# Patient Record
Sex: MALE | Race: BLACK OR AFRICAN AMERICAN | URBAN - METROPOLITAN AREA
[De-identification: names, ages, dates, MRNs, and addresses within clinical notes are randomized per-mention and may not be internally consistent; named-entity substitution may affect disease eponyms.]

---

## 2018-10-31 ENCOUNTER — ANESTHESIA EVENT (OUTPATIENT)
Dept: SURGERY | Facility: CLINIC | Age: 58
DRG: 470 | End: 2018-10-31
Payer: COMMERCIAL

## 2018-10-31 ENCOUNTER — HOSPITAL ENCOUNTER (INPATIENT)
Facility: CLINIC | Age: 58
LOS: 2 days | Discharge: HOME | DRG: 470 | End: 2018-11-02
Attending: ORTHOPAEDIC SURGERY | Admitting: ORTHOPAEDIC SURGERY
Payer: COMMERCIAL

## 2018-10-31 ENCOUNTER — ANESTHESIA (OUTPATIENT)
Dept: SURGERY | Facility: CLINIC | Age: 58
DRG: 470 | End: 2018-10-31
Payer: COMMERCIAL

## 2018-10-31 DIAGNOSIS — Z96.651 HISTORY OF ARTHROPLASTY OF RIGHT KNEE: Primary | ICD-10-CM

## 2018-10-31 PROBLEM — Z96.652 HX OF TOTAL KNEE ARTHROPLASTY, LEFT: Status: ACTIVE | Noted: 2018-10-31

## 2018-10-31 LAB — POTASSIUM SERPL-SCNC: 4 MMOL/L (ref 3.4–5.3)

## 2018-10-31 PROCEDURE — 25000128 H RX IP 250 OP 636: Performed by: NURSE ANESTHETIST, CERTIFIED REGISTERED

## 2018-10-31 PROCEDURE — 12000007 ZZH R&B INTERMEDIATE

## 2018-10-31 PROCEDURE — 25000125 ZZHC RX 250: Performed by: NURSE ANESTHETIST, CERTIFIED REGISTERED

## 2018-10-31 PROCEDURE — 27211024 ZZHC OR SUPPLY OTHER OPNP: Performed by: ORTHOPAEDIC SURGERY

## 2018-10-31 PROCEDURE — 25000128 H RX IP 250 OP 636: Performed by: SURGERY

## 2018-10-31 PROCEDURE — 25000125 ZZHC RX 250: Performed by: ORTHOPAEDIC SURGERY

## 2018-10-31 PROCEDURE — 40000171 ZZH STATISTIC PRE-PROCEDURE ASSESSMENT III: Performed by: ORTHOPAEDIC SURGERY

## 2018-10-31 PROCEDURE — 27810169 ZZH OR IMPLANT GENERAL: Performed by: ORTHOPAEDIC SURGERY

## 2018-10-31 PROCEDURE — 0SRC0J9 REPLACEMENT OF RIGHT KNEE JOINT WITH SYNTHETIC SUBSTITUTE, CEMENTED, OPEN APPROACH: ICD-10-PCS | Performed by: ORTHOPAEDIC SURGERY

## 2018-10-31 PROCEDURE — 25800025 ZZH RX 258: Performed by: ORTHOPAEDIC SURGERY

## 2018-10-31 PROCEDURE — 25000128 H RX IP 250 OP 636: Performed by: ANESTHESIOLOGY

## 2018-10-31 PROCEDURE — 71000012 ZZH RECOVERY PHASE 1 LEVEL 1 FIRST HR: Performed by: ORTHOPAEDIC SURGERY

## 2018-10-31 PROCEDURE — 27210794 ZZH OR GENERAL SUPPLY STERILE: Performed by: ORTHOPAEDIC SURGERY

## 2018-10-31 PROCEDURE — 36000093 ZZH SURGERY LEVEL 4 1ST 30 MIN: Performed by: ORTHOPAEDIC SURGERY

## 2018-10-31 PROCEDURE — 25000132 ZZH RX MED GY IP 250 OP 250 PS 637: Performed by: ORTHOPAEDIC SURGERY

## 2018-10-31 PROCEDURE — 25000128 H RX IP 250 OP 636: Performed by: ORTHOPAEDIC SURGERY

## 2018-10-31 PROCEDURE — C1776 JOINT DEVICE (IMPLANTABLE): HCPCS | Performed by: ORTHOPAEDIC SURGERY

## 2018-10-31 PROCEDURE — 84132 ASSAY OF SERUM POTASSIUM: CPT | Performed by: ANESTHESIOLOGY

## 2018-10-31 PROCEDURE — 37000009 ZZH ANESTHESIA TECHNICAL FEE, EACH ADDTL 15 MIN: Performed by: ORTHOPAEDIC SURGERY

## 2018-10-31 PROCEDURE — 36000063 ZZH SURGERY LEVEL 4 EA 15 ADDTL MIN: Performed by: ORTHOPAEDIC SURGERY

## 2018-10-31 PROCEDURE — 25000128 H RX IP 250 OP 636

## 2018-10-31 PROCEDURE — 36415 COLL VENOUS BLD VENIPUNCTURE: CPT | Performed by: ANESTHESIOLOGY

## 2018-10-31 PROCEDURE — 37000008 ZZH ANESTHESIA TECHNICAL FEE, 1ST 30 MIN: Performed by: ORTHOPAEDIC SURGERY

## 2018-10-31 PROCEDURE — 71000013 ZZH RECOVERY PHASE 1 LEVEL 1 EA ADDTL HR: Performed by: ORTHOPAEDIC SURGERY

## 2018-10-31 DEVICE — IMP COMP FEMORAL S&N LEGION PS NP SZ7 RT 71423237: Type: IMPLANTABLE DEVICE | Site: KNEE | Status: FUNCTIONAL

## 2018-10-31 DEVICE — IMP BONE CEMENT SIMPLEX W/GENTAMICIN 6195-1-001: Type: IMPLANTABLE DEVICE | Site: KNEE | Status: FUNCTIONAL

## 2018-10-31 DEVICE — IMP COMP PATELLA SNN GEN II RESURFACING 38MM 71926225: Type: IMPLANTABLE DEVICE | Site: KNEE | Status: FUNCTIONAL

## 2018-10-31 DEVICE — IMP BASEPLATE TIBIAL GENESIS II SZ 6 RT TI 71420188: Type: IMPLANTABLE DEVICE | Site: KNEE | Status: FUNCTIONAL

## 2018-10-31 DEVICE — BONE CEMENT RADIOPAQUE SIMPLEX HV FULL DOSE 6194-1-001: Type: IMPLANTABLE DEVICE | Site: KNEE | Status: FUNCTIONAL

## 2018-10-31 RX ORDER — ACETAMINOPHEN 325 MG/1
650 TABLET ORAL EVERY 4 HOURS PRN
Status: DISCONTINUED | OUTPATIENT
Start: 2018-11-03 | End: 2018-11-02 | Stop reason: HOSPADM

## 2018-10-31 RX ORDER — CEFAZOLIN SODIUM IN 0.9 % NACL 3 G/100 ML
3 INTRAVENOUS SOLUTION, PIGGYBACK (ML) INTRAVENOUS
Status: DISCONTINUED | OUTPATIENT
Start: 2018-10-31 | End: 2018-10-31 | Stop reason: HOSPADM

## 2018-10-31 RX ORDER — DIAZEPAM 5 MG
5 TABLET ORAL EVERY 6 HOURS PRN
Status: DISCONTINUED | OUTPATIENT
Start: 2018-10-31 | End: 2018-11-02 | Stop reason: HOSPADM

## 2018-10-31 RX ORDER — DEXAMETHASONE SODIUM PHOSPHATE 4 MG/ML
INJECTION, SOLUTION INTRA-ARTICULAR; INTRALESIONAL; INTRAMUSCULAR; INTRAVENOUS; SOFT TISSUE PRN
Status: DISCONTINUED | OUTPATIENT
Start: 2018-10-31 | End: 2018-10-31

## 2018-10-31 RX ORDER — FENTANYL CITRATE 50 UG/ML
25-50 INJECTION, SOLUTION INTRAMUSCULAR; INTRAVENOUS
Status: DISCONTINUED | OUTPATIENT
Start: 2018-10-31 | End: 2018-10-31 | Stop reason: HOSPADM

## 2018-10-31 RX ORDER — KETOROLAC TROMETHAMINE 30 MG/ML
INJECTION, SOLUTION INTRAMUSCULAR; INTRAVENOUS PRN
Status: DISCONTINUED | OUTPATIENT
Start: 2018-10-31 | End: 2018-10-31

## 2018-10-31 RX ORDER — CEFAZOLIN SODIUM 1 G/3ML
1 INJECTION, POWDER, FOR SOLUTION INTRAMUSCULAR; INTRAVENOUS SEE ADMIN INSTRUCTIONS
Status: DISCONTINUED | OUTPATIENT
Start: 2018-10-31 | End: 2018-10-31 | Stop reason: HOSPADM

## 2018-10-31 RX ORDER — ONDANSETRON 4 MG/1
4 TABLET, ORALLY DISINTEGRATING ORAL EVERY 6 HOURS PRN
Status: DISCONTINUED | OUTPATIENT
Start: 2018-10-31 | End: 2018-10-31

## 2018-10-31 RX ORDER — ONDANSETRON 2 MG/ML
4 INJECTION INTRAMUSCULAR; INTRAVENOUS EVERY 6 HOURS PRN
Status: DISCONTINUED | OUTPATIENT
Start: 2018-10-31 | End: 2018-10-31

## 2018-10-31 RX ORDER — ACETAMINOPHEN 325 MG/1
650 TABLET ORAL EVERY 4 HOURS PRN
Status: DISCONTINUED | OUTPATIENT
Start: 2018-11-03 | End: 2018-10-31

## 2018-10-31 RX ORDER — OXYCODONE HCL 10 MG/1
10 TABLET, FILM COATED, EXTENDED RELEASE ORAL EVERY 12 HOURS
Status: COMPLETED | OUTPATIENT
Start: 2018-10-31 | End: 2018-11-02

## 2018-10-31 RX ORDER — LIDOCAINE HYDROCHLORIDE 20 MG/ML
INJECTION, SOLUTION INFILTRATION; PERINEURAL PRN
Status: DISCONTINUED | OUTPATIENT
Start: 2018-10-31 | End: 2018-10-31

## 2018-10-31 RX ORDER — ACETAMINOPHEN 325 MG/1
975 TABLET ORAL EVERY 8 HOURS
Status: DISCONTINUED | OUTPATIENT
Start: 2018-10-31 | End: 2018-10-31

## 2018-10-31 RX ORDER — CEFAZOLIN SODIUM 2 G/100ML
2 INJECTION, SOLUTION INTRAVENOUS EVERY 8 HOURS
Status: COMPLETED | OUTPATIENT
Start: 2018-10-31 | End: 2018-11-01

## 2018-10-31 RX ORDER — ONDANSETRON 2 MG/ML
4 INJECTION INTRAMUSCULAR; INTRAVENOUS EVERY 6 HOURS PRN
Status: DISCONTINUED | OUTPATIENT
Start: 2018-10-31 | End: 2018-11-02 | Stop reason: HOSPADM

## 2018-10-31 RX ORDER — AMLODIPINE BESYLATE 10 MG/1
10 TABLET ORAL EVERY MORNING
Status: DISCONTINUED | OUTPATIENT
Start: 2018-11-01 | End: 2018-10-31

## 2018-10-31 RX ORDER — ONDANSETRON 2 MG/ML
4 INJECTION INTRAMUSCULAR; INTRAVENOUS EVERY 30 MIN PRN
Status: DISCONTINUED | OUTPATIENT
Start: 2018-10-31 | End: 2018-10-31 | Stop reason: HOSPADM

## 2018-10-31 RX ORDER — ONDANSETRON 4 MG/1
4 TABLET, ORALLY DISINTEGRATING ORAL EVERY 6 HOURS PRN
Status: DISCONTINUED | OUTPATIENT
Start: 2018-10-31 | End: 2018-11-02 | Stop reason: HOSPADM

## 2018-10-31 RX ORDER — CEFAZOLIN SODIUM IN 0.9 % NACL 3 G/100 ML
3 INTRAVENOUS SOLUTION, PIGGYBACK (ML) INTRAVENOUS
Status: COMPLETED | OUTPATIENT
Start: 2018-10-31 | End: 2018-10-31

## 2018-10-31 RX ORDER — ACETAMINOPHEN 325 MG/1
975 TABLET ORAL EVERY 8 HOURS
Status: DISCONTINUED | OUTPATIENT
Start: 2018-10-31 | End: 2018-11-02 | Stop reason: HOSPADM

## 2018-10-31 RX ORDER — FENTANYL CITRATE 50 UG/ML
INJECTION, SOLUTION INTRAMUSCULAR; INTRAVENOUS PRN
Status: DISCONTINUED | OUTPATIENT
Start: 2018-10-31 | End: 2018-10-31

## 2018-10-31 RX ORDER — NALOXONE HYDROCHLORIDE 0.4 MG/ML
.1-.4 INJECTION, SOLUTION INTRAMUSCULAR; INTRAVENOUS; SUBCUTANEOUS
Status: DISCONTINUED | OUTPATIENT
Start: 2018-10-31 | End: 2018-10-31

## 2018-10-31 RX ORDER — LIDOCAINE 40 MG/G
CREAM TOPICAL
Status: DISCONTINUED | OUTPATIENT
Start: 2018-10-31 | End: 2018-11-02 | Stop reason: HOSPADM

## 2018-10-31 RX ORDER — SODIUM CHLORIDE, SODIUM LACTATE, POTASSIUM CHLORIDE, CALCIUM CHLORIDE 600; 310; 30; 20 MG/100ML; MG/100ML; MG/100ML; MG/100ML
INJECTION, SOLUTION INTRAVENOUS CONTINUOUS
Status: DISCONTINUED | OUTPATIENT
Start: 2018-10-31 | End: 2018-10-31 | Stop reason: HOSPADM

## 2018-10-31 RX ORDER — ONDANSETRON 4 MG/1
4 TABLET, ORALLY DISINTEGRATING ORAL EVERY 30 MIN PRN
Status: DISCONTINUED | OUTPATIENT
Start: 2018-10-31 | End: 2018-10-31 | Stop reason: HOSPADM

## 2018-10-31 RX ORDER — MAGNESIUM HYDROXIDE 1200 MG/15ML
LIQUID ORAL PRN
Status: DISCONTINUED | OUTPATIENT
Start: 2018-10-31 | End: 2018-10-31 | Stop reason: HOSPADM

## 2018-10-31 RX ORDER — KETOROLAC TROMETHAMINE 30 MG/ML
30 INJECTION, SOLUTION INTRAMUSCULAR; INTRAVENOUS EVERY 6 HOURS PRN
Status: DISCONTINUED | OUTPATIENT
Start: 2018-10-31 | End: 2018-10-31

## 2018-10-31 RX ORDER — NALOXONE HYDROCHLORIDE 0.4 MG/ML
.1-.4 INJECTION, SOLUTION INTRAMUSCULAR; INTRAVENOUS; SUBCUTANEOUS
Status: DISCONTINUED | OUTPATIENT
Start: 2018-10-31 | End: 2018-11-02 | Stop reason: HOSPADM

## 2018-10-31 RX ORDER — EPHEDRINE SULFATE 50 MG/ML
INJECTION, SOLUTION INTRAMUSCULAR; INTRAVENOUS; SUBCUTANEOUS PRN
Status: DISCONTINUED | OUTPATIENT
Start: 2018-10-31 | End: 2018-10-31

## 2018-10-31 RX ORDER — DEXTROSE MONOHYDRATE, SODIUM CHLORIDE, AND POTASSIUM CHLORIDE 50; 1.49; 4.5 G/1000ML; G/1000ML; G/1000ML
INJECTION, SOLUTION INTRAVENOUS CONTINUOUS
Status: DISCONTINUED | OUTPATIENT
Start: 2018-10-31 | End: 2018-11-02 | Stop reason: HOSPADM

## 2018-10-31 RX ORDER — CEFAZOLIN SODIUM 2 G/100ML
2 INJECTION, SOLUTION INTRAVENOUS
Status: DISCONTINUED | OUTPATIENT
Start: 2018-10-31 | End: 2018-10-31 | Stop reason: HOSPADM

## 2018-10-31 RX ORDER — DIAZEPAM 5 MG
5 TABLET ORAL EVERY 6 HOURS PRN
Status: DISCONTINUED | OUTPATIENT
Start: 2018-10-31 | End: 2018-10-31

## 2018-10-31 RX ORDER — DEXTROSE MONOHYDRATE, SODIUM CHLORIDE, AND POTASSIUM CHLORIDE 50; 1.49; 4.5 G/1000ML; G/1000ML; G/1000ML
INJECTION, SOLUTION INTRAVENOUS CONTINUOUS
Status: DISCONTINUED | OUTPATIENT
Start: 2018-10-31 | End: 2018-10-31

## 2018-10-31 RX ORDER — AMOXICILLIN 250 MG
1 CAPSULE ORAL 2 TIMES DAILY
Status: DISCONTINUED | OUTPATIENT
Start: 2018-10-31 | End: 2018-11-02 | Stop reason: HOSPADM

## 2018-10-31 RX ORDER — AMLODIPINE BESYLATE 10 MG/1
10 TABLET ORAL EVERY MORNING
Status: DISCONTINUED | OUTPATIENT
Start: 2018-10-31 | End: 2018-11-02 | Stop reason: HOSPADM

## 2018-10-31 RX ORDER — KETOROLAC TROMETHAMINE 30 MG/ML
30 INJECTION, SOLUTION INTRAMUSCULAR; INTRAVENOUS EVERY 6 HOURS PRN
Status: DISCONTINUED | OUTPATIENT
Start: 2018-10-31 | End: 2018-11-02 | Stop reason: HOSPADM

## 2018-10-31 RX ORDER — CEFAZOLIN SODIUM 2 G/100ML
2 INJECTION, SOLUTION INTRAVENOUS EVERY 8 HOURS
Status: DISCONTINUED | OUTPATIENT
Start: 2018-10-31 | End: 2018-10-31

## 2018-10-31 RX ORDER — HYDROMORPHONE HYDROCHLORIDE 1 MG/ML
.5-1 INJECTION, SOLUTION INTRAMUSCULAR; INTRAVENOUS; SUBCUTANEOUS
Status: DISCONTINUED | OUTPATIENT
Start: 2018-10-31 | End: 2018-10-31

## 2018-10-31 RX ORDER — HYDROMORPHONE HYDROCHLORIDE 1 MG/ML
.5-1 INJECTION, SOLUTION INTRAMUSCULAR; INTRAVENOUS; SUBCUTANEOUS
Status: DISCONTINUED | OUTPATIENT
Start: 2018-10-31 | End: 2018-11-02 | Stop reason: HOSPADM

## 2018-10-31 RX ORDER — HYDROMORPHONE HYDROCHLORIDE 1 MG/ML
.3-.5 INJECTION, SOLUTION INTRAMUSCULAR; INTRAVENOUS; SUBCUTANEOUS EVERY 5 MIN PRN
Status: DISCONTINUED | OUTPATIENT
Start: 2018-10-31 | End: 2018-10-31 | Stop reason: HOSPADM

## 2018-10-31 RX ORDER — PROPOFOL 10 MG/ML
INJECTION, EMULSION INTRAVENOUS CONTINUOUS PRN
Status: DISCONTINUED | OUTPATIENT
Start: 2018-10-31 | End: 2018-10-31

## 2018-10-31 RX ORDER — OXYCODONE HYDROCHLORIDE 5 MG/1
5-10 TABLET ORAL
Status: DISCONTINUED | OUTPATIENT
Start: 2018-10-31 | End: 2018-11-02 | Stop reason: HOSPADM

## 2018-10-31 RX ORDER — AMOXICILLIN 250 MG
2 CAPSULE ORAL 2 TIMES DAILY
Status: DISCONTINUED | OUTPATIENT
Start: 2018-10-31 | End: 2018-11-02 | Stop reason: HOSPADM

## 2018-10-31 RX ORDER — LIDOCAINE 40 MG/G
CREAM TOPICAL
Status: DISCONTINUED | OUTPATIENT
Start: 2018-10-31 | End: 2018-10-31

## 2018-10-31 RX ORDER — HYDROMORPHONE HYDROCHLORIDE 1 MG/ML
INJECTION, SOLUTION INTRAMUSCULAR; INTRAVENOUS; SUBCUTANEOUS
Status: COMPLETED
Start: 2018-10-31 | End: 2018-10-31

## 2018-10-31 RX ORDER — OXYCODONE HYDROCHLORIDE 5 MG/1
5-10 TABLET ORAL
Status: DISCONTINUED | OUTPATIENT
Start: 2018-10-31 | End: 2018-10-31

## 2018-10-31 RX ORDER — ASPIRIN 325 MG
325 TABLET ORAL 2 TIMES DAILY
Status: DISCONTINUED | OUTPATIENT
Start: 2018-10-31 | End: 2018-11-02 | Stop reason: HOSPADM

## 2018-10-31 RX ORDER — OXYCODONE HCL 10 MG/1
10 TABLET, FILM COATED, EXTENDED RELEASE ORAL EVERY 12 HOURS
Status: DISCONTINUED | OUTPATIENT
Start: 2018-10-31 | End: 2018-10-31

## 2018-10-31 RX ADMIN — DEXAMETHASONE SODIUM PHOSPHATE 4 MG: 4 INJECTION, SOLUTION INTRA-ARTICULAR; INTRALESIONAL; INTRAMUSCULAR; INTRAVENOUS; SOFT TISSUE at 11:50

## 2018-10-31 RX ADMIN — AMLODIPINE BESYLATE 10 MG: 10 TABLET ORAL at 17:43

## 2018-10-31 RX ADMIN — Medication 0.5 MG: at 15:38

## 2018-10-31 RX ADMIN — PROPOFOL 100 MCG/KG/MIN: 10 INJECTION, EMULSION INTRAVENOUS at 11:30

## 2018-10-31 RX ADMIN — HYDROMORPHONE HYDROCHLORIDE 0.5 MG: 1 INJECTION, SOLUTION INTRAMUSCULAR; INTRAVENOUS; SUBCUTANEOUS at 15:38

## 2018-10-31 RX ADMIN — HYDROMORPHONE HYDROCHLORIDE 0.5 MG: 1 INJECTION, SOLUTION INTRAMUSCULAR; INTRAVENOUS; SUBCUTANEOUS at 14:27

## 2018-10-31 RX ADMIN — SODIUM CHLORIDE, POTASSIUM CHLORIDE, SODIUM LACTATE AND CALCIUM CHLORIDE: 600; 310; 30; 20 INJECTION, SOLUTION INTRAVENOUS at 10:45

## 2018-10-31 RX ADMIN — ASPIRIN 325 MG ORAL TABLET 325 MG: 325 PILL ORAL at 20:24

## 2018-10-31 RX ADMIN — FENTANYL CITRATE 25 MCG: 50 INJECTION, SOLUTION INTRAMUSCULAR; INTRAVENOUS at 12:15

## 2018-10-31 RX ADMIN — Medication 3 G: at 11:30

## 2018-10-31 RX ADMIN — MIDAZOLAM 1 MG: 1 INJECTION INTRAMUSCULAR; INTRAVENOUS at 11:15

## 2018-10-31 RX ADMIN — FENTANYL CITRATE 25 MCG: 50 INJECTION, SOLUTION INTRAMUSCULAR; INTRAVENOUS at 11:30

## 2018-10-31 RX ADMIN — LIDOCAINE HYDROCHLORIDE 40 MG: 20 INJECTION, SOLUTION INFILTRATION; PERINEURAL at 11:30

## 2018-10-31 RX ADMIN — KETOROLAC TROMETHAMINE 30 MG: 30 INJECTION, SOLUTION INTRAMUSCULAR at 12:30

## 2018-10-31 RX ADMIN — SENNOSIDES AND DOCUSATE SODIUM 2 TABLET: 8.6; 5 TABLET ORAL at 20:24

## 2018-10-31 RX ADMIN — OXYCODONE HYDROCHLORIDE 10 MG: 10 TABLET, FILM COATED, EXTENDED RELEASE ORAL at 17:43

## 2018-10-31 RX ADMIN — CEFAZOLIN SODIUM 2 G: 2 INJECTION, SOLUTION INTRAVENOUS at 20:24

## 2018-10-31 RX ADMIN — ACETAMINOPHEN 975 MG: 325 TABLET, FILM COATED ORAL at 21:10

## 2018-10-31 RX ADMIN — Medication 5 MG: at 12:15

## 2018-10-31 RX ADMIN — PHENYLEPHRINE HYDROCHLORIDE 100 MCG: 10 INJECTION, SOLUTION INTRAMUSCULAR; INTRAVENOUS; SUBCUTANEOUS at 12:00

## 2018-10-31 RX ADMIN — POTASSIUM CHLORIDE, DEXTROSE MONOHYDRATE AND SODIUM CHLORIDE: 150; 5; 450 INJECTION, SOLUTION INTRAVENOUS at 17:43

## 2018-10-31 RX ADMIN — SODIUM CHLORIDE, POTASSIUM CHLORIDE, SODIUM LACTATE AND CALCIUM CHLORIDE: 600; 310; 30; 20 INJECTION, SOLUTION INTRAVENOUS at 12:00

## 2018-10-31 ASSESSMENT — ACTIVITIES OF DAILY LIVING (ADL)
AMBULATION: 0 - INDEPENDENT
BATHING: 0 - INDEPENDENT
ADLS_ACUITY_SCORE: 9
TRANSFERRING: 0 - INDEPENDENT
COGNITION: 0 - NO COGNITION ISSUES REPORTED
SWALLOWING: 0 - SWALLOWS FOODS/LIQUIDS WITHOUT DIFFICULTY
EATING: 0 - INDEPENDENT
DRESS: 0 - INDEPENDENT
ADLS_ACUITY_SCORE: 10
TOILETING: 0 - INDEPENDENT
COMMUNICATION: 0 - UNDERSTANDS/COMMUNICATES WITHOUT DIFFICULTY

## 2018-10-31 NOTE — PROGRESS NOTES
Admission medication history interview status for the 10/31/2018  admission is complete. See EPIC admission navigator for prior to admission medications     Medication history source reliability:Good    Medication history interview source(s):Patient    Medication history resources (including written lists, pill bottles, clinic record):Amlodipine bottle    Primary pharmacy.[facility]    Additional medication history information not noted on PTA med list :None    Time spent in this activity: 30 minutes    Prior to Admission medications    Medication Sig Last Dose Taking? Auth Provider   AMLODIPINE BESYLATE PO Take 10 mg by mouth every morning 10/30/2018 at am Yes Reported, Patient   ORDER FOR DME CPAP MACHINE  WITH TUBING AND SUPPLIES   Darron Grover MD

## 2018-10-31 NOTE — ANESTHESIA PREPROCEDURE EVALUATION
Procedure: Procedure(s):  RIGHT TOTAL KNEE ARTHROPLASTY (COSTELLO AND NEPHEW)^  Preop diagnosis: DJD RIGHT KNEE     No Known Allergies  Past Medical History:   Diagnosis Date     Esophageal stricture     s/p dilation     HTN (hypertension)      Obese      TEMITOPE on CPAP      Osteoarthritis, knee      Past Surgical History:   Procedure Laterality Date     ARTHROPLASTY KNEE Left 8/25/2016    Procedure: ARTHROPLASTY KNEE;  Surgeon: Colby Costello MD;  Location: [facility] OR     ARTHROSCOPY KNEE RT/LT Left      HERNIA REPAIR       Social History   Substance Use Topics     Smoking status: Never Smoker     Smokeless tobacco: Never Used     Alcohol use 0.0 oz/week     0 Standard drinks or equivalent per week      Comment: wine 2-3 times per week     Prior to Admission medications    Medication Sig Start Date End Date Taking? Authorizing Provider   AMLODIPINE BESYLATE PO Take 10 mg by mouth every morning   Yes Reported, Patient   ORDER FOR [facility] CPAP MACHINE  WITH TUBING AND SUPPLIES 2/24/10   Darron Grover MD     Current Facility-Administered Medications Ordered in Epic   Medication Dose Route Frequency Last Rate Last Dose     ceFAZolin (ANCEF) 1 g vial to attach to  ml bag for ADULT or 50 ml bag for PEDS  1 g Intravenous See Admin Instructions         ceFAZolin (ANCEF) intermittent infusion 2 g in 100 mL dextrose PRE-MIX  2 g Intravenous Pre-Op/Pre-procedure x 1 dose         lactated ringers infusion   Intravenous Continuous         lidocaine 1 % 1 mL  1 mL Other Q1H PRN         No current [facility]-ordered outpatient prescriptions on file.       lactated ringers       Wt Readings from Last 1 Encounters:   10/31/18 129.2 kg (284 lb 14.4 oz)     Temp Readings from Last 1 Encounters:   10/31/18 36.2  C (97.1  F) (Temporal)     BP Readings from Last 6 Encounters:   10/31/18 134/86   10/19/16 135/90   10/07/16 (!) 131/97   09/08/16 (!) 138/98   08/27/16 137/87   08/24/16 130/78     Pulse Readings from Last 4 Encounters:   10/19/16 97    10/07/16 82   08/26/16 90   08/17/16 79     Resp Readings from Last 1 Encounters:   10/31/18 16     SpO2 Readings from Last 1 Encounters:   10/31/18 97%     Recent Labs   Lab Test  10/31/18   0948  10/07/16   1305  08/25/16   0558  07/14/16   1728   NA   --   140   --   139   POTASSIUM  4.0  3.7  3.7  3.8   CHLORIDE   --   103   --   105   CO2   --   28   --   33*   ANIONGAP   --   9   --   1*   GLC   --   95   --   93   BUN   --   15   --   12   CR   --   1.04  1.21  1.02   FLORA   --   8.7   --   8.7     No results for input(s): AST, ALT, ALKPHOS, BILITOTAL, LIPASE in the last 21140 hours.  Recent Labs   Lab Test  10/07/16   1305  08/27/16   0631   08/25/16   0558   HGB  13.0*  11.7*   < >  14.7   PLT   --    --    --   284    < > = values in this interval not displayed.     No results for input(s): ABO, RH in the last 58214 hours.  No results for input(s): INR, PTT in the last 41822 hours.   No results for input(s): TROPI in the last 87144 hours.  No results for input(s): PH, PCO2, PO2, HCO3 in the last 13775 hours.  No results for input(s): HCG in the last 31555 hours.  No results found for this or any previous visit (from the past 744 hour(s)).    RECENT LABS:   ECG:   ECHO:       Anesthesia Evaluation     .             ROS/MED HX    ENT/Pulmonary:     (+)sleep apnea, uses CPAP , . .    Neurologic:       Cardiovascular:     (+) hypertension----. : . . . :. .       METS/Exercise Tolerance:  >4 METS   Hematologic:         Musculoskeletal:   (+) arthritis, , , -       GI/Hepatic: Comment: Esophageal stricture s/p dilation    (+) GERD       Renal/Genitourinary:         Endo:     (+) Obesity, .      Psychiatric:         Infectious Disease:         Malignancy:         Other:                     Physical Exam  Normal systems: dental    Airway   TM distance: >3 FB  Neck ROM: full    Dental     Cardiovascular   Rhythm and rate: regular and normal      Pulmonary    breath sounds clear to auscultation                     Anesthesia Plan      History & Physical Review  History and physical reviewed and following examination; no interval change.    ASA Status:  2 .    NPO Status:  > 8 hours    Plan for Spinal and Periph. Nerve Block for postop pain   PONV prophylaxis:  Ondansetron (or other 5HT-3) and Dexamethasone or Solumedrol       Postoperative Care  Postoperative pain management:  IV analgesics, Peripheral nerve block (Single Shot) and Multi-modal analgesia.      Consents  Anesthetic plan, risks, benefits and alternatives discussed with:  Patient..                          .

## 2018-10-31 NOTE — ANESTHESIA CARE TRANSFER NOTE
Patient: Luciano Brown    Procedure(s):  RIGHT TOTAL KNEE ARTHROPLASTY (CLAROS AND NEPHEW)^    Diagnosis: DJD RIGHT KNEE   Diagnosis Additional Information: No value filed.    Anesthesia Type:   Spinal, Periph. Nerve Block for postop pain     Note:  Airway :Face Mask  Patient transferred to:PACU  Comments: Transferred to PACU  Awake doing fine  Report to Jordyn  RNHandoff Report: Identifed the Patient, Identified the Reponsible Provider, Reviewed the pertinent medical history, Discussed the surgical course, Reviewed Intra-OP anesthesia mangement and issues during anesthesia, Set expectations for post-procedure period and Allowed opportunity for questions and acknowledgement of understanding      Vitals: (Last set prior to Anesthesia Care Transfer)    CRNA VITALS  10/31/2018 1230 - 10/31/2018 1306      10/31/2018             Resp Rate (set): 10                Electronically Signed By: FLORI Rogel CRNA  October 31, 2018  1:06 PM

## 2018-10-31 NOTE — ANESTHESIA PROCEDURE NOTES
Peripheral nerve/Neuraxial procedure note : Femoral (adductor canal)  Pre-Procedure  Performed by CESAR MEDELLIN  Location: pre-op      Pre-Anesthestic Checklist: patient identified, IV checked, site marked, risks and benefits discussed, informed consent, monitors and equipment checked, pre-op evaluation, at physician/surgeon's request and post-op pain management    Timeout  Correct Patient: Yes   Correct Procedure: Yes   Correct Site: Yes   Correct Laterality: Yes   Correct Position: Yes   Site Marked: Yes   .   Procedure Documentation    .    Procedure:  right  Femoral (adductor canal).  Local skin infiltrated with 3 mL of 1% lidocaine.     Ultrasound used to identify targeted nerve, plexus, or vascular marker and placed a needle adjacent to it., Ultrasound was used to visualize the spread of the anesthetic in close proximity to the above stated nerve. A permanent image is entered into the patient's record.  Patient Prep;mask, sterile gloves, chlorhexidine gluconate and isopropyl alcohol, patient draped.  .  Needle: insulated Needle Gauge: 21.    Needle Length (Inches) 3.5  Insertion Method: Single Shot.       Assessment/Narrative  Paresthesias: No.  Injection made incrementally with aspirations every 5 mL..  The placement was negative for: blood aspirated, painful injection and site bleeding.  Bolus given via needle..   Secured via.   Complications: none. Comments:  Medication: 25cc of 0.5% bupivacaine with 1:400k epinephrine  [place]-dissection with saline, 5cc  Dose given via 5cc increments with negative aspiration

## 2018-10-31 NOTE — IP AVS SNAPSHOT
MRN:[id number]                      After Visit Summary   10/31/2018    Luciano Brown    MRN: [id number]           Thank you!     Thank you for choosing [facility] for your care. Our goal is always to provide you with excellent care. Hearing back from our patients is one way we can continue to improve our services. Please take a few minutes to complete the written survey that you may receive in the mail after you visit with us. Thank you!        Patient Information     Date Of Birth          1960        Designated Caregiver       Most Recent Value    Caregiver    Will someone help with your care after discharge? yes    Name of designated caregiver Irma    Phone number of caregiver     Caregiver address [address] [place] [address]      About your hospital stay     You were admitted on:  October 31, 2018 You last received care in the:  [place] [address] [facility]    You were discharged on:  November 2, 2018        Reason for your hospital stay       Right total knee                  Who to Call     For medical emergencies, please call 911.  For non-urgent questions about your medical care, please call your primary care provider or clinic, None  For questions related to your surgery, please call your surgery clinic        Attending Provider     Provider Colby Carson MD Orthopedics       Primary Care Provider Fax #    Physician No Ref-Primary [phone]      After Care Instructions     Wound care and dressings       Instructions to care for your wound at home: daily dressing changes  Redress 4x4,abd,thigh high hanna, may shower.                  Follow-up Appointments     Follow-up and recommended labs and tests        arranged                  Your next 10 appointments already scheduled     Nov 05, 2018  9:00 AM CST   (Arrive by 8:45 AM)   TARAH Extremity with Venus Garcia PT   [facility]  "[place])    [address]  [facility] [address] [phone]              Additional Services     Physical Therapy Referral       If you have not heard from the scheduling office within 2 business days, please call [phone] for all locations, with the exception of [place], please call [phone] and [place], please call [phone].    Please be aware that coverage of these services is subject to the terms and limitations of your health insurance plan.  Call member services at your health plan with any benefit or coverage questions.                  Pending Results     No orders found from 10/29/2018 to 11/1/2018.            Statement of Approval     Ordered          11/02/18 0901  I have reviewed and agree with all the recommendations and orders detailed in this document.  EFFECTIVE NOW     Approved and electronically signed by:  Colby Costello MD             Admission Information     Date & Time Provider Department Dept. Phone    10/31/2018 Colby Costello MD [place] [address] [facility] [phone]      Your Vitals Were     Blood Pressure Pulse Temperature Respirations Height Weight    139/77 (BP Location: Left arm) 72 98.3  F (36.8  C) (Oral) 16 1.88 m (6' 2\") 129.2 kg (284 lb 14.4 oz)    Pulse Oximetry BMI (Body Mass Index)                93% 36.58 kg/m2          [employer] Information     [employer] lets you send messages to your doctor, view your test results, renew your prescriptions, schedule appointments and more. To sign up, go to [email or web address] . Click on \"Log in\" on the left side of the screen, which will take you to the Welcome page. Then click on \"Sign up Now\" on the right side of the page.     You will be asked to enter the access code listed below, as well as some personal information. Please follow the directions to create your username and password.     Your access code is: [email or web address]  Expires: 1/31/2019  1:54 PM     Your access " code will  in 90 days. If you need help or a new code, please call your [facility] clinic or [phone].        Care EveryWhere ID     This is your Care EveryWhere ID. This could be used by other organizations to access your [facility] medical records  [id number]        Equal Access to Services     SEVEN CARLOS : Hadii hamzah martínez hadasho Soomaali, waaxda luqadaha, qaybta kaalmada adeegyada, lele mcclurekodyroger jay. So [facility] [place] [phone].    ATENCIÓN: Si habla español, tiene a rust disposición servicios gratuitos de asistencia lingüística. Nas al [phone].    We comply with applicable federal civil rights laws and [place] laws. We do not discriminate on the basis of race, color, national origin, age, disability, sex, sexual orientation, or gender identity.               Review of your medicines      START taking        Dose / Directions    acetaminophen 325 MG tablet   Commonly known as:  TYLENOL        Dose:  975 mg   Take 3 tablets (975 mg) by mouth every 8 hours   Quantity:  100 tablet   Refills:  0       aspirin 325 MG tablet        Dose:  325 mg   Take 1 tablet (325 mg) by mouth 2 times daily   Quantity:  30 tablet   Refills:  0       diazepam 5 MG tablet   Commonly known as:  VALIUM        Dose:  5 mg   Take 1 tablet (5 mg) by mouth every 6 hours as needed for anxiety   Quantity:  30 tablet   Refills:  0       ketorolac 10 MG tablet   Commonly known as:  TORADOL        Dose:  10 mg   Take 1 tablet (10 mg) by mouth every 6 hours as needed   Quantity:  20 tablet   Refills:  0       order for DME        Equipment being ordered: Walker Wheels () and Walker () Treatment Diagnosis: Impaired ambulation   Quantity:  1 each   Refills:  0       oxyCODONE IR 5 MG tablet   Commonly known as:  ROXICODONE        Dose:  5-10 mg   Take 1-2 tablets (5-10 mg) by mouth every 3 hours as needed   Quantity:  60 tablet   Refills:  0       senna-docusate 8.6-50 MG per tablet   Commonly known as:   SENOKOT-S;PERICOLACE        Dose:  1 tablet   Take 1 tablet by mouth 2 times daily   Quantity:  30 tablet   Refills:  1         CONTINUE these medicines which have NOT CHANGED        Dose / Directions    AMLODIPINE BESYLATE PO        Dose:  10 mg   Take 10 mg by mouth every morning   Refills:  0       order for DME   Used for:  TEMITOPE (obstructive sleep apnea)        CPAP MACHINE  WITH TUBING AND SUPPLIES   Quantity:  1 Device   Refills:  1            Where to get your medicines      These medications were sent to [facility] [place], [place] - [address]  [address], [place] [address]     Phone:  [phone]     acetaminophen 325 MG tablet    aspirin 325 MG tablet    ketorolac 10 MG tablet    senna-docusate 8.6-50 MG per tablet         Some of these will need a paper prescription and others can be bought over the counter. Ask your nurse if you have questions.     Bring a paper prescription for each of these medications     diazepam 5 MG tablet    order for DME    oxyCODONE IR 5 MG tablet                Protect others around you: Learn how to safely use, store and throw away your medicines at www.disposemymeds.org.        Information about OPIOIDS     PRESCRIPTION OPIOIDS: WHAT YOU NEED TO KNOW   We gave you an opioid (narcotic) pain medicine. It is important to manage your pain, but opioids are not always the best choice. You should first try all the other options your care team gave you. Take this medicine for as short a time (and as few doses) as possible.    Some activities can increase your pain, such as bandage changes or therapy sessions. It may help to take your pain medicine 30 to 60 minutes before these activities. Reduce your stress by getting enough sleep, working on hobbies you enjoy and practicing relaxation or meditation. Talk to your care team about ways to manage your pain beyond prescription opioids.    These medicines have risks:    DO NOT drive when on new or higher  doses of pain medicine. These medicines can affect your alertness and reaction times, and you could be arrested for driving under the influence (DUI). If you need to use opioids long-term, talk to your care team about driving.    DO NOT operate heavy machinery    DO NOT do any other dangerous activities while taking these medicines.    DO NOT drink any alcohol while taking these medicines.     If the opioid prescribed includes acetaminophen, DO NOT take with any other medicines that contain acetaminophen. Read all labels carefully. Look for the word  acetaminophen  or  Tylenol.  Ask your pharmacist if you have questions or are unsure.    You can get addicted to pain medicines, especially if you have a history of addiction (chemical, alcohol or substance dependence). Talk to your care team about ways to reduce this risk.    All opioids tend to cause constipation. Drink plenty of water and eat foods that have a lot of fiber, such as fruits, vegetables, prune juice, apple juice and high-fiber cereal. Take a laxative (Miralax, milk of magnesia, Colace, Senna) if you don t move your bowels at least every other day. Other side effects include upset stomach, sleepiness, dizziness, throwing up, tolerance (needing more of the medicine to have the same effect), physical dependence and slowed breathing.    Store your pills in a secure place, locked if possible. We will not replace any lost or stolen medicine. If you don t finish your medicine, please throw away (dispose) as directed by your pharmacist. The [place] Pollution Control Agency has more information about safe disposal: https://www.pca.[facility].[place].us/living-green/managing-unwanted-medications             Medication List: This is a list of all your medications and when to take them. Check marks below indicate your daily home schedule. Keep this list as a reference.      Medications           Morning Afternoon Evening Bedtime As Needed    acetaminophen 325 MG tablet    Commonly known as:  TYLENOL   Take 3 tablets (975 mg) by mouth every 8 hours   Last time this was given:  975 mg on 11/2/2018  1:35 PM                                   AMLODIPINE BESYLATE PO   Take 10 mg by mouth every morning   Last time this was given:  10 mg on 11/2/2018  7:55 AM                                   aspirin 325 MG tablet   Take 1 tablet (325 mg) by mouth 2 times daily   Last time this was given:  325 mg on 11/2/2018  7:55 AM                                      diazepam 5 MG tablet   Commonly known as:  VALIUM   Take 1 tablet (5 mg) by mouth every 6 hours as needed for anxiety                                   ketorolac 10 MG tablet   Commonly known as:  TORADOL   Take 1 tablet (10 mg) by mouth every 6 hours as needed                                   order for DME   CPAP MACHINE  WITH TUBING AND SUPPLIES                                order for DME   Equipment being ordered: Walker Wheels () and Walker () Treatment Diagnosis: Impaired ambulation                                oxyCODONE IR 5 MG tablet   Commonly known as:  ROXICODONE   Take 1-2 tablets (5-10 mg) by mouth every 3 hours as needed   Last time this was given:  10 mg on 11/2/2018  1:35 PM                                   senna-docusate 8.6-50 MG per tablet   Commonly known as:  SENOKOT-S;PERICOLACE   Take 1 tablet by mouth 2 times daily   Last time this was given:  1 tablet on 11/2/2018  7:55 AM

## 2018-10-31 NOTE — ANESTHESIA POSTPROCEDURE EVALUATION
Patient: Luciano Brown    Procedure(s):  RIGHT TOTAL KNEE ARTHROPLASTY (CLAROS AND NEPHEW)^    Diagnosis:DJD RIGHT KNEE   Diagnosis Additional Information: No value filed.    Anesthesia Type:  Spinal, Periph. Nerve Block for postop pain    Note:  Anesthesia Post Evaluation    Patient location during evaluation: PACU  Patient participation: Able to fully participate in evaluation  Level of consciousness: awake and alert  Pain management: adequate  Airway patency: patent  Cardiovascular status: acceptable and hemodynamically stable  Respiratory status: acceptable and unassisted  Hydration status: acceptable  PONV: none     Anesthetic complications: None          Last vitals:  Vitals:    10/31/18 1109 10/31/18 1300 10/31/18 1310   BP: 133/90 112/79 110/75   Resp:  12 13   Temp:   35.4  C (95.7  F)   SpO2: 95% 100% 100%         Electronically Signed By: Steven Carnes MD  October 31, 2018  1:34 PM

## 2018-10-31 NOTE — IP AVS SNAPSHOT
[address] [facility]    [address] [place] [address]    Phone:  [phone]                                       After Visit Summary   10/31/2018    Luciano Brown    MRN: [id number]           After Visit Summary Signature Page     I have received my discharge instructions, and my questions have been answered. I have discussed any challenges I see with this plan with the nurse or doctor.    ..........................................................................................................................................  Patient/Patient Representative Signature      ..........................................................................................................................................  Patient Representative Print Name and Relationship to Patient    ..................................................               ................................................  Date                                   Time    ..........................................................................................................................................  Reviewed by Signature/Title    ...................................................              ..............................................  Date                                               Time          22EPIC Rev 08/18

## 2018-10-31 NOTE — ANESTHESIA PROCEDURE NOTES
Peripheral nerve/Neuraxial procedure note : intrathecal  Pre-Procedure  Performed by CESAR MEDELLIN  Location: OR      Pre-Anesthestic Checklist: patient identified, IV checked, risks and benefits discussed, informed consent, monitors and equipment checked, pre-op evaluation and at physician/surgeon's request    Timeout  Correct Patient: Yes   Correct Procedure: Yes   Correct Site: Yes   Correct Laterality: N/A   Correct Position: Yes   Site Marked: N/A   .   Procedure Documentation    .    Procedure:    Intrathecal.  Insertion Site:L3-4  (midline approach)      Patient Prep;mask, sterile gloves, povidone-iodine 7.5% surgical scrub, patient draped.  .  Needle: Shelley-Mumtaz Spinal Needle (gauge): 24  Spinal/LP Needle Length (inches): 3.5 # of attempts: 1 and # of redirects: : 0. Introducer used Introducer: 20 G .       Assessment/Narrative  Paresthesias: No.  .  .  clear CSF fluid removed . Comments:  Injected 13.5mg Bupivacaine 0.75% + dextrose  Patient tolerated the procedure well and without complications.  Patient laid flat immediately.

## 2018-10-31 NOTE — OR NURSING
PNDS Criteria met.  Dr Douglas here to assess Mr. Brown; order received to transfer to [facility] for continued recovery.  Hand-off report to RN.  To 513-1 per cart with all belongings, including personal CPAP.  Will transport with Capnography monitoring.

## 2018-11-01 ENCOUNTER — APPOINTMENT (OUTPATIENT)
Dept: PHYSICAL THERAPY | Facility: CLINIC | Age: 58
DRG: 470 | End: 2018-11-01
Attending: ORTHOPAEDIC SURGERY
Payer: COMMERCIAL

## 2018-11-01 LAB
GLUCOSE BLDC GLUCOMTR-MCNC: 102 MG/DL (ref 70–99)
HGB BLD-MCNC: 13.4 G/DL (ref 13.3–17.7)

## 2018-11-01 PROCEDURE — 97530 THERAPEUTIC ACTIVITIES: CPT | Mod: GP

## 2018-11-01 PROCEDURE — 40000193 ZZH STATISTIC PT WARD VISIT

## 2018-11-01 PROCEDURE — 25000132 ZZH RX MED GY IP 250 OP 250 PS 637: Performed by: ORTHOPAEDIC SURGERY

## 2018-11-01 PROCEDURE — 25000128 H RX IP 250 OP 636: Performed by: ORTHOPAEDIC SURGERY

## 2018-11-01 PROCEDURE — 97116 GAIT TRAINING THERAPY: CPT | Mod: GP

## 2018-11-01 PROCEDURE — 97110 THERAPEUTIC EXERCISES: CPT | Mod: GP

## 2018-11-01 PROCEDURE — 00000146 ZZHCL STATISTIC GLUCOSE BY METER IP

## 2018-11-01 PROCEDURE — 85018 HEMOGLOBIN: CPT | Performed by: ORTHOPAEDIC SURGERY

## 2018-11-01 PROCEDURE — 36415 COLL VENOUS BLD VENIPUNCTURE: CPT | Performed by: ORTHOPAEDIC SURGERY

## 2018-11-01 PROCEDURE — 97161 PT EVAL LOW COMPLEX 20 MIN: CPT | Mod: GP

## 2018-11-01 PROCEDURE — 12000007 ZZH R&B INTERMEDIATE

## 2018-11-01 RX ADMIN — SENNOSIDES AND DOCUSATE SODIUM 2 TABLET: 8.6; 5 TABLET ORAL at 08:15

## 2018-11-01 RX ADMIN — OXYCODONE HYDROCHLORIDE 10 MG: 5 TABLET ORAL at 09:00

## 2018-11-01 RX ADMIN — ASPIRIN 325 MG ORAL TABLET 325 MG: 325 PILL ORAL at 08:15

## 2018-11-01 RX ADMIN — ACETAMINOPHEN 975 MG: 325 TABLET, FILM COATED ORAL at 06:25

## 2018-11-01 RX ADMIN — OXYCODONE HYDROCHLORIDE 10 MG: 5 TABLET ORAL at 13:11

## 2018-11-01 RX ADMIN — OXYCODONE HYDROCHLORIDE 10 MG: 10 TABLET, FILM COATED, EXTENDED RELEASE ORAL at 06:25

## 2018-11-01 RX ADMIN — ACETAMINOPHEN 975 MG: 325 TABLET, FILM COATED ORAL at 13:10

## 2018-11-01 RX ADMIN — OXYCODONE HYDROCHLORIDE 10 MG: 5 TABLET ORAL at 17:56

## 2018-11-01 RX ADMIN — CEFAZOLIN SODIUM 2 G: 2 INJECTION, SOLUTION INTRAVENOUS at 04:04

## 2018-11-01 RX ADMIN — KETOROLAC TROMETHAMINE 30 MG: 30 INJECTION, SOLUTION INTRAMUSCULAR at 15:23

## 2018-11-01 RX ADMIN — ASPIRIN 325 MG ORAL TABLET 325 MG: 325 PILL ORAL at 20:03

## 2018-11-01 RX ADMIN — SENNOSIDES AND DOCUSATE SODIUM 2 TABLET: 8.6; 5 TABLET ORAL at 20:03

## 2018-11-01 RX ADMIN — AMLODIPINE BESYLATE 10 MG: 10 TABLET ORAL at 08:15

## 2018-11-01 RX ADMIN — OXYCODONE HYDROCHLORIDE 10 MG: 5 TABLET ORAL at 01:11

## 2018-11-01 RX ADMIN — KETOROLAC TROMETHAMINE 30 MG: 30 INJECTION, SOLUTION INTRAMUSCULAR at 01:11

## 2018-11-01 RX ADMIN — OXYCODONE HYDROCHLORIDE 10 MG: 10 TABLET, FILM COATED, EXTENDED RELEASE ORAL at 17:58

## 2018-11-01 ASSESSMENT — ACTIVITIES OF DAILY LIVING (ADL)
ADLS_ACUITY_SCORE: 9

## 2018-11-01 ASSESSMENT — PAIN DESCRIPTION - DESCRIPTORS
DESCRIPTORS: SORE
DESCRIPTORS: ACHING;SORE

## 2018-11-01 NOTE — PLAN OF CARE
Problem: Patient Care Overview  Goal: Plan of Care/Patient Progress Review  Outcome: Improving  A&O, VSS on RA, pain well-managed w/PO pain medication regimen. Received PRN oxycodone, Toradol overnight as well as scheduled OxyContin and Tylenol this morning. Ambulating to commode w/gait belt and walker (x1 assist). Voiding adequately in urinal. Tolerating regular diet. Dressing remains CDI.  this morning.

## 2018-11-01 NOTE — PLAN OF CARE
Problem: Patient Care Overview  Goal: Plan of Care/Patient Progress Review  Discharge Planner PT   Patient plan for discharge: Home with outpatient PT  Current status: Pt is 58 year old male adm on 10/31/18 for elective R TKR. At baseline pt lives with spouse in [place], has flight of stairs there, is currently staying in first floor home with no stairs, independent without use of assistive device. PT orders received, chart reviewed, evaluation completed and treatment initiated. Pt is min assist with bed mobility, CGA for transfers, and SBA with ambulation with rolling walker. Pt with limited R knee flexion ROM, unable to perform SLR without assistance at this time.  Barriers to return to prior living situation: Decreased strength, decreased ROM  Recommendations for discharge: Home with outpatient PT  Rationale for recommendations: Pt is mobilizing well, anticipate he will achieve functional mobility level necessary for discharge to home with outpatient PT to further progress ROM strength and promote optimal functional outcomes.       Entered by: Lottie Veliz 11/01/2018 10:01 AM       PM Session: Pt walter well, able to ambulate 300' with rolling walker and SBA, 2 episodes of knee buckling, pt able to self recover. Pt c/o 6/10 pain with mobility.

## 2018-11-01 NOTE — PLAN OF CARE
Problem: Patient Care Overview  Goal: Plan of Care/Patient Progress Review  Outcome: Improving  Alert and oriented X 4, up with A1 with WW/GB. Dressing changed to RLE, small serosanguinous drainage; CDI. CMS intact. Pain controlled on oxycontin, oxycodone, APAP, toradol, ice. Tolerating diet. Voiding adequately. IV SL, VSS on RA. Working well in therapies. Plan to discharge home tomorrow.

## 2018-11-01 NOTE — PLAN OF CARE
Problem: Patient Care Overview  Goal: Plan of Care/Patient Progress Review  Outcome: Improving  Pt arrived from surgery at 1515. Spinal. A/O. Up with A1 walker GB. Denies pain medication. Using scheduled tylenol. IvSL. Straight cathed @ 2000, 1000ml out, DTV. Refusing hospital cpap. Continue to monitor.

## 2018-11-01 NOTE — PROGRESS NOTES
11/01/18 0947   Quick Adds   Type of Visit Initial PT Evaluation   Living Environment   Lives With spouse   Living Arrangements house   Home Accessibility stairs to enter home;stairs within home   Number of Stairs to Enter Home 3   Number of Stairs Within Home 12   Stair Railings at Home outside, present at both sides;inside, present on right side   Transportation Available car;family or friend will provide   Living Environment Comment Pt lives in [place], is currently staying in [facility] while in [place], first floor no stairs. Pt will have a flight of stairs to negotiate upon return to [place].    Self-Care   Dominant Hand right   Usual Activity Tolerance good   Current Activity Tolerance moderate   Regular Exercise no   Equipment Currently Used at Home none   Activity/Exercise/Self-Care Comment Pt is active, coaches basketball, does a lot of swimming for low impact exercise, has been limited due to knee pain   Functional Level Prior   Ambulation 0-->independent   Transferring 0-->independent   Fall history within last six months no   General Information   Onset of Illness/Injury or Date of Surgery - Date 10/31/18   Referring Physician Colby Costello MD   Patient/Family Goals Statement To get back to basketball   Pertinent History of Current Problem (include personal factors and/or comorbidities that impact the POC) Pt is 58 year old male adm on 10/31/18 for elective R TKR, pt had L TKR in 2016.   Precautions/Limitations fall precautions   Weight-Bearing Status - RLE weight-bearing as tolerated   General Info Comments Activity: ambulate, KI until SLR   Cognitive Status Examination   Orientation orientation to person, place and time   Level of Consciousness alert   Pain Assessment   Patient Currently in Pain (2/10 R knee pain with activity)   Range of Motion (ROM)   ROM Comment Pt resistant to R knee flexion   Strength   Strength Comments able to initiate R quad but needing assist to perform SLR   Bed  "Mobility   Bed Mobility Comments Min assist for R LE   Transfer Skills   Transfer Comments CGA   Gait   Gait Comments SBA with rolling walker   Balance   Balance Comments Good   Sensory Examination   Sensory Perception Comments Denies numbness/tingling   Modality Interventions   Planned Modality Interventions Cryotherapy   General Therapy Interventions   Planned Therapy Interventions bed mobility training;gait training;ROM;strengthening;transfer training   Clinical Impression   Criteria for Skilled Therapeutic Intervention yes, treatment indicated   PT Diagnosis Impaired functional independence   Influenced by the following impairments Decreased strength, decreased ROM   Functional limitations due to impairments Decreased ability to participate in daily mobility tasks   Clinical Presentation Stable/Uncomplicated   Clinical Presentation Rationale Current status, [facility]   Clinical Decision Making (Complexity) Low complexity   Therapy Frequency` 2 times/day   Predicted Duration of Therapy Intervention (days/wks) 3 days   Anticipated Equipment Needs at Discharge front wheeled walker   Anticipated Discharge Disposition Home with Outpatient Therapy;Home with Assist   Risk & Benefits of therapy have been explained Yes   Patient, Family & other staff in agreement with plan of care Yes   [facility] TM \"6 Clicks\"   2016, Trustees of [facility], under license to [employer].  All rights reserved.   6 Clicks Short Forms Basic Mobility Inpatient Short Form   [facility]  \"6 Clicks\" V.2 Basic Mobility Inpatient Short Form   1. Turning from your back to your side while in a flat bed without using bedrails? 4 - None   2. Moving from lying on your back to sitting on the side of a flat bed without using bedrails? 3 - A Little   3. Moving to and from a bed to a chair (including a wheelchair)? 3 - A Little   4. Standing up from a chair using your arms (e.g., wheelchair, or bedside chair)? 3 - A Little "   5. To walk in hospital room? 3 - A Little   6. Climbing 3-5 steps with a railing? 3 - A Little   Basic Mobility Raw Score (Score out of 24.Lower scores equate to lower levels of function) 19   Total Evaluation Time   Total Evaluation Time (Minutes) 10

## 2018-11-01 NOTE — BRIEF OP NOTE
[facility] Brief Operative Note    Pre-operative diagnosis: DJD RIGHT KNEE    Post-operative diagnosis same   Procedure: Procedure(s):  RIGHT TOTAL KNEE ARTHROPLASTY (COSTELLO AND KAYLYNN)^   Surgeon(s): Surgeon(s) and Role:     * Colby Costello MD - Primary     * Jeanette Warren PA-C - Assisting   Estimated blood loss: 5 mL    Specimens: * No specimens in log *   Findings: arthritis

## 2018-11-01 NOTE — PROGRESS NOTES
SPIRITUAL HEALTH SERVICES Progress Note  [facility]     visited by request.  Patient and his wife now live in [place] but used to live in the [facility] and are still members of [employer] ([employer] [place]  Patient has spent time with his former  from [employer] and other [employer] friends while here now.  Patient's wife has flown up to be with him too.  Patient and his wife are very involved in their community and patient will be glad to have better mobility after this knee replacement.     listened supportively and provided prayer.    Patient seems thankful for his knee replacement, well supported and optimistic.    No plan to follow up.      Olivier Ro   Intern

## 2018-11-02 ENCOUNTER — APPOINTMENT (OUTPATIENT)
Dept: PHYSICAL THERAPY | Facility: CLINIC | Age: 58
DRG: 470 | End: 2018-11-02
Attending: ORTHOPAEDIC SURGERY
Payer: COMMERCIAL

## 2018-11-02 ENCOUNTER — APPOINTMENT (OUTPATIENT)
Dept: OCCUPATIONAL THERAPY | Facility: CLINIC | Age: 58
DRG: 470 | End: 2018-11-02
Attending: ORTHOPAEDIC SURGERY
Payer: COMMERCIAL

## 2018-11-02 VITALS
RESPIRATION RATE: 16 BRPM | SYSTOLIC BLOOD PRESSURE: 139 MMHG | WEIGHT: 284.9 LBS | HEIGHT: 74 IN | BODY MASS INDEX: 36.56 KG/M2 | TEMPERATURE: 98.3 F | OXYGEN SATURATION: 93 % | HEART RATE: 72 BPM | DIASTOLIC BLOOD PRESSURE: 77 MMHG

## 2018-11-02 LAB
GLUCOSE SERPL-MCNC: 96 MG/DL (ref 70–99)
HGB BLD-MCNC: 11.9 G/DL (ref 13.3–17.7)

## 2018-11-02 PROCEDURE — 97116 GAIT TRAINING THERAPY: CPT | Mod: GP

## 2018-11-02 PROCEDURE — 97165 OT EVAL LOW COMPLEX 30 MIN: CPT | Mod: GO | Performed by: OCCUPATIONAL THERAPIST

## 2018-11-02 PROCEDURE — 40000193 ZZH STATISTIC PT WARD VISIT

## 2018-11-02 PROCEDURE — 36415 COLL VENOUS BLD VENIPUNCTURE: CPT | Performed by: ORTHOPAEDIC SURGERY

## 2018-11-02 PROCEDURE — 25000132 ZZH RX MED GY IP 250 OP 250 PS 637: Performed by: ORTHOPAEDIC SURGERY

## 2018-11-02 PROCEDURE — 97110 THERAPEUTIC EXERCISES: CPT | Mod: GP

## 2018-11-02 PROCEDURE — 85018 HEMOGLOBIN: CPT | Performed by: ORTHOPAEDIC SURGERY

## 2018-11-02 PROCEDURE — 82947 ASSAY GLUCOSE BLOOD QUANT: CPT | Performed by: ORTHOPAEDIC SURGERY

## 2018-11-02 PROCEDURE — 97535 SELF CARE MNGMENT TRAINING: CPT | Mod: GO | Performed by: OCCUPATIONAL THERAPIST

## 2018-11-02 PROCEDURE — 97530 THERAPEUTIC ACTIVITIES: CPT | Mod: GO | Performed by: OCCUPATIONAL THERAPIST

## 2018-11-02 PROCEDURE — 40000133 ZZH STATISTIC OT WARD VISIT: Performed by: OCCUPATIONAL THERAPIST

## 2018-11-02 PROCEDURE — 97530 THERAPEUTIC ACTIVITIES: CPT | Mod: GP

## 2018-11-02 RX ORDER — OXYCODONE HYDROCHLORIDE 5 MG/1
5-10 TABLET ORAL
Qty: 60 TABLET | Refills: 0 | Status: SHIPPED | OUTPATIENT
Start: 2018-11-02

## 2018-11-02 RX ORDER — ASPIRIN 325 MG
325 TABLET ORAL 2 TIMES DAILY
Qty: 30 TABLET | Refills: 0 | Status: SHIPPED | OUTPATIENT
Start: 2018-11-02

## 2018-11-02 RX ORDER — KETOROLAC TROMETHAMINE 10 MG/1
10 TABLET, FILM COATED ORAL EVERY 6 HOURS PRN
Qty: 20 TABLET | Refills: 0 | Status: SHIPPED | OUTPATIENT
Start: 2018-11-02

## 2018-11-02 RX ORDER — DIAZEPAM 5 MG
5 TABLET ORAL EVERY 6 HOURS PRN
Qty: 30 TABLET | Refills: 0 | Status: SHIPPED | OUTPATIENT
Start: 2018-11-02

## 2018-11-02 RX ORDER — AMOXICILLIN 250 MG
1 CAPSULE ORAL 2 TIMES DAILY
Qty: 30 TABLET | Refills: 1 | Status: SHIPPED | OUTPATIENT
Start: 2018-11-02

## 2018-11-02 RX ORDER — ACETAMINOPHEN 325 MG/1
975 TABLET ORAL EVERY 8 HOURS
Qty: 100 TABLET | Refills: 0 | Status: SHIPPED | OUTPATIENT
Start: 2018-11-02

## 2018-11-02 RX ADMIN — ACETAMINOPHEN 975 MG: 325 TABLET, FILM COATED ORAL at 13:35

## 2018-11-02 RX ADMIN — ACETAMINOPHEN 975 MG: 325 TABLET, FILM COATED ORAL at 00:58

## 2018-11-02 RX ADMIN — OXYCODONE HYDROCHLORIDE 10 MG: 5 TABLET ORAL at 00:58

## 2018-11-02 RX ADMIN — SENNOSIDES AND DOCUSATE SODIUM 1 TABLET: 8.6; 5 TABLET ORAL at 07:55

## 2018-11-02 RX ADMIN — ACETAMINOPHEN 975 MG: 325 TABLET, FILM COATED ORAL at 06:56

## 2018-11-02 RX ADMIN — AMLODIPINE BESYLATE 10 MG: 10 TABLET ORAL at 07:55

## 2018-11-02 RX ADMIN — ASPIRIN 325 MG ORAL TABLET 325 MG: 325 PILL ORAL at 07:55

## 2018-11-02 RX ADMIN — OXYCODONE HYDROCHLORIDE 10 MG: 10 TABLET, FILM COATED, EXTENDED RELEASE ORAL at 06:56

## 2018-11-02 RX ADMIN — OXYCODONE HYDROCHLORIDE 10 MG: 5 TABLET ORAL at 13:35

## 2018-11-02 RX ADMIN — OXYCODONE HYDROCHLORIDE 10 MG: 5 TABLET ORAL at 09:58

## 2018-11-02 RX ADMIN — OXYCODONE HYDROCHLORIDE 10 MG: 5 TABLET ORAL at 03:59

## 2018-11-02 RX ADMIN — OXYCODONE HYDROCHLORIDE 10 MG: 5 TABLET ORAL at 06:56

## 2018-11-02 ASSESSMENT — ACTIVITIES OF DAILY LIVING (ADL)
ADLS_ACUITY_SCORE: 9
PREVIOUS_RESPONSIBILITIES: MEAL PREP;HOUSEKEEPING;LAUNDRY;SHOPPING;YARDWORK;MEDICATION MANAGEMENT;FINANCES;DRIVING
ADLS_ACUITY_SCORE: 9

## 2018-11-02 NOTE — PLAN OF CARE
Problem: Patient Care Overview  Goal: Plan of Care/Patient Progress Review  Outcome: Improving  Pt is A&Ox4, CMS intact except complains of slight numbness around knee. Up w/ one assist to bathroom, voiding adequately. Pain well managed w/ oxycodone.Slept well overnight. Refused Dion stocking. Possibly discharging home today. Will continue to monitor.

## 2018-11-02 NOTE — PLAN OF CARE
Problem: Patient Care Overview  Goal: Plan of Care/Patient Progress Review  Outcome: No Change  A&O x 4, VSS on RA, pain controlled with oral analgesics, drsg CDI, CMS intact, up with assist of 1 walker, voiding adequately in bathroom, IV pulled ,  To discharge home after therapy, continue to monitor.]

## 2018-11-02 NOTE — PROGRESS NOTES
11/02/18 0902   Quick Adds   Type of Visit Initial Occupational Therapy Evaluation   Living Environment   Lives With spouse   Living Arrangements house   Living Environment Comment Pt staying at [employer] [facility] while in [place], lives in [place]    Self-Care   Dominant Hand right   Usual Activity Tolerance good   Current Activity Tolerance good   Functional Level Prior   Ambulation 0-->independent   Transferring 0-->independent   Toileting 0-->independent   Bathing 0-->independent   Dressing 0-->independent   Eating 0-->independent   Communication 0-->understands/communicates without difficulty   Swallowing 0-->swallows foods/liquids without difficulty   Cognition 0 - no cognition issues reported   Fall history within last six months no   General Information   Onset of Illness/Injury or Date of Surgery - Date 10/31/18   Referring Physician Colby Costello MD   Patient/Family Goals Statement return home   Additional Occupational Profile Info/Pertinent History of Current Problem s/p R TKA   Precautions/Limitations fall precautions   Weight-Bearing Status - RUE weight-bearing as tolerated   Cognitive Status Examination   Orientation orientation to person, place and time   Level of Consciousness alert   Able to Follow Commands WNL/WFL   Personal Safety (Cognitive) WNL/WFL   Memory intact   Attention No deficits were identified   Visual Perception   Visual Perception Wears glasses  (wears for driving)   Sensory Examination   Sensory Comments none   Pain Assessment   Patient Currently in Pain Yes, see Vital Sign flowsheet   Range of Motion (ROM)   ROM Comment WFL bilateral UE    Strength   Strength Comments 5/5 bilateral UE   Hand Strength   Hand Strength Comments WFL bilaterally    Bed Mobility Skill: Rolling/Turning   Level of [place] - Bed Mobility Skill Rolling Turning stand-by assist   Bed Mobility Skill: Sit to Supine   Level of [place]: Sit/Supine minimum assist (75% patients effort)   Bed Mobility Skill:  "Supine to Sit   Level of [place]: Supine/Sit stand-by assist   Transfer Skill: Sit to Stand   Level of [place]: Sit/Stand stand-by assist   Assistive Device for Transfer: Sit/Stand standard walker   Transfer Skill: Toilet Transfer   Level of [place]: Toilet stand-by assist   Weight-Bearing Restrictions: Toilet weight-bearing as tolerated   Assistive Device standard walker   Balance   Balance Comments decreased balance   Lower Body Dressing   Level of [place]: Dress Lower Body stand-by assist   Toileting   Level of [place]: Toilet independent   Instrumental Activities of Daily Living (IADL)   Previous Responsibilities meal prep;housekeeping;laundry;shopping;yardwork;medication management;finances;driving   Activities of Daily Living Analysis   Impairments Contributing to Impaired Activities of Daily Living balance impaired;pain   Clinical Impression   Criteria for Skilled Therapeutic Interventions Met yes, treatment indicated   OT Diagnosis decreased I with ADLs and functional mobility    Influenced by the following impairments decreased balance, pain   Assessment of Occupational Performance 1-3 Performance Deficits   Identified Performance Deficits decreased I with ADLs such as dressing, bathing, toileting and functional mobility    Clinical Decision Making (Complexity) Low complexity   Therapy Frequency (1 tx only)   Predicted Duration of Therapy Intervention (days/wks) 1 day   Anticipated Discharge Disposition Home with Assist   Risks and Benefits of Treatment have been explained. Yes   Patient, Family & other staff in agreement with plan of care Yes   [employer] AM-PAC  \"6 Clicks\" Daily Activity Inpatient Short Form   1. Putting on and taking off regular lower body clothing? 3 - A Little   2. Bathing (including washing, rinsing, drying)? 3 - A Little   3. Toileting, which includes using toilet, bedpan or urinal? 3 - A Little   4. Putting on and taking off regular upper body " clothing? 4 - None   5. Taking care of personal grooming such as brushing teeth? 4 - None   6. Eating meals? 4 - None   Daily Activity Raw Score (Score out of 24.Lower scores equate to lower levels of function) 21   Total Evaluation Time   Total Evaluation Time (Minutes) 10

## 2018-11-02 NOTE — PLAN OF CARE
Problem: Patient Care Overview  Goal: Plan of Care/Patient Progress Review  Discharge Planner OT   Patient plan for discharge: [employer] with wife   Current status: eval and treatment only. Pt admitted due to s/p R TKA. Previously, pt I with all ADLs, IADLs, and functional mobility. Pt currently c/o of pain; rates 6/10 prior to activity. Pt completes bed mobility with min A to support R LE. Sit <> stand with SBA and cues for hand placement; ambulates to bathroom with SBA and 2ww. Pt educated on toilet transfer and completes with SBA and cues for technique. Pt educated on lower body dressing, demonstrates technique with pants with SBA. Educated pt on potential use of AE for dressing and bathing; pt reports wife will A as needed. No further OT needs at this time, orders completed.  Barriers to return to prior living situation: pain  Recommendations for discharge: home with A   Rationale for recommendations: Pt is expected to safely return home; pt wife available to A with ADLs such as dressing, toileting, and bathing as needed while healing, pt expected to return to PLOF and increase I with ADLs and functional mobility to PLOF with healing.        Entered by: Mary Jiménez 11/02/2018 9:32 AM         Occupational Therapy Discharge Summary    Reason for therapy discharge:    All goals and outcomes met, no further needs identified.    Progress towards therapy goal(s). See goals on Care Plan in [facility] electronic health record for goal details.  Goals met    Therapy recommendation(s):    No further therapy is recommended.

## 2018-11-03 NOTE — OP NOTE
Procedure Date: 10/31/2018      PREOPERATIVE DIAGNOSIS:  Degenerative arthrosis, right knee.      POSTOPERATIVE DIAGNOSIS:  Degenerative arthrosis, right knee.      PROCEDURE:  Right total knee arthroplasty.      COMPLICATIONS AND ASSOCIATED OVERALL COMORBIDITIES:  None apparent.      COMPONENTS:  [employer] hyperflexed knee LPS size 7 femur, 6 baseplate, 12 poly spacer and 38 poly button.      SURGEON:  HANY Costello MD      ASSISTANT:  Jeanette Warren PA-C      ANESTHESIA:  Spinal.      BLOOD LOSS:  Less than 5 mL.      TOURNIQUET TIME:  61 minutes.      INDICATIONS:  This is a 58-year-old gentleman with end-stage tricompartment disease of his right knee with a contralateral total knee arthroplasty with good excellent result.  He has diminution in ADL function, wants to proceed with knee arthroplasty.  Benefits and risks were discussed, and consent for the procedure obtained.      DESCRIPTION OF PROCEDURE:  The patient was taken to the operating room.  Spinal anesthesia administered, augmented with a femoral block.  Routine prepping and draping, elevation, exsanguination, tourniquet inflated to 350 mmHg pressure.  Straight line incision made down through the soft tissue.  Patella was everted, had severe end-stage tricompartment disease documented.  Through an intramedullary approach, a distal femoral cut was done in 5 degrees of valgus, 3 degrees of external rotation with 9.5 resection off the lateral femoral condyle.  Anterior and posterior and chamfer cuts were then done to use a 7 femur with preparation intercondylar notch for an LPS construct.  On the tibia, minimal resection was done perpendicular to the axis of the ankle with a 2 mm resection off the medial side and 12 off the lateral side.  Posterior osteophytes removed routinely and a 6 baseplate was positioned with a 12 poly spacer, which afforded no flexion extension gap and normal tracking.  Inferior patellar surface was then prepared with  sharp oscillating saw for 38 poly button which again confirmed normal tracking.  After preparation of all bony surfaces under pressurization technique, the tibia, femur and patella were cemented.  Spacer placed.  All excess cement removed after curing and after documentation of normal tracking without flexion, extension gap, closure was done with interrupted #2 Ethibond in the deep fascia, augmented with a running V-Loc, 2-0 Vicryl in the subcutaneous tissue and staples within the skin.  Placement in a routine compressive dressing, leaving the operating facility in satisfactory condition overall, no unusual apparent complications.         YARITZA CLAROS MD             D: 2018   T: 2018   MT: NTS      Name:     SALUD ODOM   MRN:      [id number]        Account:        [id number]   :      1960           Procedure Date: 10/31/2018      Document: [id number]       cc: Darron Grover MD

## 2018-11-05 ENCOUNTER — THERAPY VISIT (OUTPATIENT)
Dept: PHYSICAL THERAPY | Facility: CLINIC | Age: 58
End: 2018-11-05
Payer: COMMERCIAL

## 2018-11-05 ENCOUNTER — TELEPHONE (OUTPATIENT)
Dept: FAMILY MEDICINE | Facility: CLINIC | Age: 58
End: 2018-11-05

## 2018-11-05 DIAGNOSIS — R26.9 GAIT ABNORMALITY: ICD-10-CM

## 2018-11-05 DIAGNOSIS — Z47.1 AFTERCARE FOLLOWING RIGHT KNEE JOINT REPLACEMENT SURGERY: Primary | ICD-10-CM

## 2018-11-05 DIAGNOSIS — Z96.651 AFTERCARE FOLLOWING RIGHT KNEE JOINT REPLACEMENT SURGERY: Primary | ICD-10-CM

## 2018-11-05 DIAGNOSIS — Z96.651 HISTORY OF ARTHROPLASTY OF RIGHT KNEE: ICD-10-CM

## 2018-11-05 PROCEDURE — 97140 MANUAL THERAPY 1/> REGIONS: CPT | Mod: GP | Performed by: PHYSICAL THERAPIST

## 2018-11-05 PROCEDURE — 97110 THERAPEUTIC EXERCISES: CPT | Mod: GP | Performed by: PHYSICAL THERAPIST

## 2018-11-05 PROCEDURE — 97161 PT EVAL LOW COMPLEX 20 MIN: CPT | Mod: GP | Performed by: PHYSICAL THERAPIST

## 2018-11-05 ASSESSMENT — ACTIVITIES OF DAILY LIVING (ADL)
STAND: ACTIVITY IS FAIRLY DIFFICULT
GO UP STAIRS: I AM UNABLE TO DO THE ACTIVITY
RAW_SCORE: 12
HOW_WOULD_YOU_RATE_THE_CURRENT_FUNCTION_OF_YOUR_KNEE_DURING_YOUR_USUAL_DAILY_ACTIVITIES_ON_A_SCALE_FROM_0_TO_100_WITH_100_BEING_YOUR_LEVEL_OF_KNEE_FUNCTION_PRIOR_TO_YOUR_INJURY_AND_0_BEING_THE_INABILITY_TO_PERFORM_ANY_OF_YOUR_USUAL_DAILY_ACTIVITIES?: 30
KNEE_ACTIVITY_OF_DAILY_LIVING_SUM: 12
STIFFNESS: THE SYMPTOM PREVENTS ME FROM ALL DAILY ACTIVITIES
KNEEL ON THE FRONT OF YOUR KNEE: I AM UNABLE TO DO THE ACTIVITY
KNEE_ACTIVITY_OF_DAILY_LIVING_SCORE: 17.14
SQUAT: I AM UNABLE TO DO THE ACTIVITY
RISE FROM A CHAIR: ACTIVITY IS VERY DIFFICULT
WEAKNESS: THE SYMPTOM AFFECTS MY ACTIVITY SEVERELY
GO DOWN STAIRS: I AM UNABLE TO DO THE ACTIVITY
HOW_WOULD_YOU_RATE_THE_OVERALL_FUNCTION_OF_YOUR_KNEE_DURING_YOUR_USUAL_DAILY_ACTIVITIES?: SEVERELY ABNORMAL
WALK: ACTIVITY IS VERY DIFFICULT
PAIN: THE SYMPTOM PREVENTS ME FROM ALL DAILY ACTIVITIES
SIT WITH YOUR KNEE BENT: ACTIVITY IS VERY DIFFICULT
LIMPING: THE SYMPTOM AFFECTS MY ACTIVITY SEVERELY
GIVING WAY, BUCKLING OR SHIFTING OF KNEE: THE SYMPTOM AFFECTS MY ACTIVITY SLIGHTLY
AS_A_RESULT_OF_YOUR_KNEE_INJURY,_HOW_WOULD_YOU_RATE_YOUR_CURRENT_LEVEL_OF_DAILY_ACTIVITY?: ABNORMAL
SWELLING: THE SYMPTOM AFFECTS MY ACTIVITY MODERATELY

## 2018-11-05 NOTE — DISCHARGE SUMMARY
Admit Date:     10/31/2018   Discharge Date:     2018      ADMISSION DIAGNOSIS:  Degenerative arthrosis, right knee.      DISCHARGE DIAGNOSIS:  Degenerative arthrosis, right knee.      PROCEDURE:  Right total knee arthroplasty.      COMPLICATIONS AND ASSOCIATED OVERALL COMORBIDITIES:  None apparent.      HISTORY AND HOSPITAL COURSE:  The patient was taken to the operating room same day's admission and underwent an uneventful right knee arthroplasty.  He is currently afebrile.  His perioperative hemoglobin is 11.9.  Ambulating independently.  Active straight leg raising, negative Homans test.  He will be discharged to home on oxycodone, Tylenol and Toradol to be taken on a p.r.n. basis.  A single Ascriptin given for anticoagulation prophylaxis b.i.d. for 10 days.  Outpatient therapy has been arranged as well as return to clinic.         YARITZA CLAROS MD             D: 2018   T: 2018   MT: JOSE M      Name:     SALUD ODOM   MRN:      [id number]        Account:        [id number]   :      1960           Admit Date:     10/31/2018                                  Discharge Date: 2018      Document: [id number]

## 2018-11-05 NOTE — TELEPHONE ENCOUNTER
Hx Of Total Knee Arthroplasty, Left, History Of Arthroplasty Of Right Knee, Djd Right Knee 11/02/2018 6 mo ED/IP 0/0  [phone] (home)

## 2018-11-05 NOTE — LETTER
[facility] ATHLETIC [facility] PHYSICAL THERAPY  [address]  [facility] [address]  [phone]    2018    Re: Luciano Brown   :   1960  MRN:  [id number]   REFERRING PHYSICIAN:   Colby Costello    [facility]    Date of Initial Evaluation:  2018  Visits:  Rxs Used: 1  Reason for Referral:     History of arthroplasty of right knee  Aftercare following right knee joint replacement surgery  Gait abnormality    EVALUATION SUMMARY    Subjective:  Patient is a 58 year old male presenting with rehab right knee hpi. The history is provided by the patient. No  was used.   Luciano Brown is a 58 year old male with a right knee condition.  Condition occurred with:  Degenerative joint disease. This is a new condition  I had Right TKA on 10/31/2018.    Patient reports pain:  Anterior and lateral.  Radiates to:  Thigh.  Pain is described as sharp and is constant and reported as 5/10 and 7/10.  Associated symptoms:  Numbness and edema. Pain is the same all the time.  Symptoms are exacerbated by walking, activity, ascending stairs and bending/squatting and relieved by ice (compression, medication).  Since onset symptoms are gradually improving.    Previous treatment includes physical therapy (left TKA). Pertinent medical history includes:  High blood pressure and sleep disorder/apnea.  Current occupation .  Patient is currently not working due to present treatment problem.  Primary job tasks include:  Prolonged sitting (computer, plane and car).  Barriers: couple of levels, house is in [place], staying at \[facility]".  Red flags:  None as reported by the patient.                  Objective:  Standing Alignment:    Lumbar:  Lordosis decr  Knee deviations alignment: right knee flexed,   Gait:    Gait Type:  Antalgic   Weight Bearing Status:  WBAT   Assistive Devices:   Walker  Deviations:  Knee:  Knee flexion decr R and knee extension decr RAnkle:  Push off decr R and heel strike decr R  Flexibility/Screens:   Positive screens:  Knee  Lower Extremity:  Decreased right lower extremity flexibility:  IT Band; Quadriceps; Hamstrings and Gastroc  Re: Luciano Brown   :   1960         Knee Evaluation:  ROM:    AROM  Extension: Left:    Right:  Unable to kick it straight  Flexion: Left:   Right: 45  PROM  Hyperextension: Left:   Right:  0  Extension: Left:   Right:  10  Flexion: Left:   Right:  65  Endfeel: needs mod assist of 1 for SLR  Strength:   Extension:  Right: 1+/5   Pain:  Flexion:  Right: 3-/5   Pain:    Quad Set Right: Poor    Pain:  Palpation:  Palpation of knee: distal quad on the right   Right knee tenderness present at:  Incisional; Biceps Femoral; Semitendinosus; Patellar Medial; Patellar Lateral; Patellar Superior and Patellar Inferior  Edema:  Edema of the knee: significant knee and calf swelling   Mobility Testing:    Patellofemoral Medial:  Right: hypomobile  Patellofemoral Superior:  Right: hypomobile  Patellofemoral Inferior:  Right: hypomobile  Functional Testing:    Proprioception:   Stork Balance Test: Left:   Right:  Unable  % of Uninvolved:     Assessment/Plan:    Patient is a 58 year old male with right side knee complaints.    Patient has the following significant findings with corresponding treatment plan.                Diagnosis 1:  Right TKA  Pain -  hot/cold therapy, manual therapy, self management, education and home program  Decreased ROM/flexibility - manual therapy, therapeutic exercise, therapeutic activity and home program  Decreased joint mobility - manual therapy, therapeutic exercise, therapeutic activity and home program  Decreased strength - therapeutic exercise, therapeutic activities and home program  Impaired balance - neuro re-education, gait training, therapeutic activities, adaptive equipment/assistive device and home  program  Edema - cold therapy and self management/home program  Impaired gait - gait training, assistive devices and home program  Impaired muscle performance - neuro re-education and home program  Decreased function - therapeutic activities and home program    Therapy Evaluation Codes:   1) History comprised of:   Personal factors that impact the plan of care:      Living environment and Past/current experiences.    Comorbidity factors that impact the plan of care are:      High blood pressure, Sleep disorder/apnea, Weakness and Left TKA.     Medications impacting care: High blood pressure and Pain.  Re: Luciano SANTIZO Kevin   :   1960    2) Examination of Body Systems comprised of:   Body structures and functions that impact the plan of care:      Knee.   Activity limitations that impact the plan of care are:      Driving, Dressing, Lifting, Sitting, Squatting/kneeling, Stairs, Standing, Walking, Working and Sleeping.  3) Clinical presentation characteristics are:   Evolving/Changing.  4) Decision-Making    Low complexity using standardized patient assessment instrument and/or measureable assessment of functional outcome.  Cumulative Therapy Evaluation is: Low complexity.    Previous and current functional limitations:  (See Goal Flow Sheet for this information)    Short term and Long term goals: (See Goal Flow Sheet for this information)     Communication ability:  Patient appears to be able to clearly communicate and understand verbal and written communication and follow directions correctly.  Treatment Explanation - The following has been discussed with the patient:   RX ordered/plan of care  Possible risks and side effects  This patient would benefit from PT intervention to resume normal activities.   Rehab potential is good.    Frequency:  3 X week, once daily  Duration:  for 2 weeks tapering to 1 X a week over 4 weeks  Discharge Plan:  Achieve all LTG.  Independent in home treatment program.    Thank  you for your referral.    INQUIRIES  Therapist: Venus Garcia, PT  [facility] [place] PHYSICAL THERAPY  [address] [address]  Phone: [phone]  Fax: [phone]

## 2018-11-05 NOTE — TELEPHONE ENCOUNTER
ED / Discharge Outreach Protocol    Patient Contact    Attempt # 1    Was call answered?  No.  Left message on voicemail with information to call triage back.  Pt called back.    About your hospital stay            You were admitted on:  October 31, 2018 You last received care in the:  [place] [address] [facility]      You were discharged on:  November 2, 2018                 Reason for your hospital stay         Right total knee              After Care Instructions           Wound care and dressings         Instructions to care for your wound at home: daily dressing changes  Redress 4x4,abd,thigh high hanna, may shower.                        Follow-up Appointments           Follow-up and recommended labs and tests          arranged                        Your next 10 appointments already scheduled            Nov 05, 2018  9:00 AM CST   (Arrive by 8:45 AM)   [facility] Extremity with Venus Garcia PT   [place] [facility] ([facility])     [address]  [facility] [address] [phone]                    Additional Services           Physical Therapy Referral         If you have not heard from the scheduling office within 2 business days, please call [phone] for all locations, with the exception of [place], please call [phone] and [facility] [place], please call [phone].     Please be aware that coverage of these services is subject to the terms and limitations of your health insurance plan.  Call member services at your health plan with any benefit or coverage questions.                      START taking       Dose / Directions     acetaminophen 325 MG tablet   Commonly known as:  TYLENOL          Dose:  975 mg   Take 3 tablets (975 mg) by mouth every 8 hours   Quantity:  100 tablet   Refills:  0         aspirin 325 MG tablet          Dose:  325 mg   Take 1 tablet (325 mg) by mouth 2 times daily   Quantity:  30  tablet   Refills:  0         diazepam 5 MG tablet   Commonly known as:  VALIUM          Dose:  5 mg   Take 1 tablet (5 mg) by mouth every 6 hours as needed for anxiety   Quantity:  30 tablet   Refills:  0         ketorolac 10 MG tablet   Commonly known as:  TORADOL          Dose:  10 mg   Take 1 tablet (10 mg) by mouth every 6 hours as needed   Quantity:  20 tablet   Refills:  0         order for DME          Equipment being ordered: Walker Wheels () and Walker () Treatment Diagnosis: Impaired ambulation   Quantity:  1 each   Refills:  0         oxyCODONE IR 5 MG tablet   Commonly known as:  ROXICODONE          Dose:  5-10 mg   Take 1-2 tablets (5-10 mg) by mouth every 3 hours as needed   Quantity:  60 tablet   Refills:  0         senna-docusate 8.6-50 MG per tablet   Commonly known as:  SENOKOT-S;PERICOLACE          Dose:  1 tablet   Take 1 tablet by mouth 2 times daily   Quantity:  30 tablet   Refills:  1          CONTINUE these medicines which have NOT CHANGED       Dose / Directions     AMLODIPINE BESYLATE PO          Dose:  10 mg   Take 10 mg by mouth every morning   Refills:  0         order for DME   Used for:  TEMITOPE (obstructive sleep apnea)          CPAP MACHINE  WITH TUBING AND SUPPLIES   Quantity:  1 Device   Refills:  1       No questions regarding dressing/wound  No BM yet  Passing gas  Pt is taking his senna- will call in two days if no BM  Pain is controlled with Oxycodone  Has been using walker with no issues  Refuses f/u with Quyen Tom, only wants f/u with Dr. Pravin TOPETE, RN

## 2018-11-05 NOTE — PROGRESS NOTES
[facility] Initial Evaluation  Subjective:  Patient is a 58 year old male presenting with rehab right knee hpi. The history is provided by the patient. No  was used.   Luciano Brown is a 58 year old male with a right knee condition.  Condition occurred with:  Degenerative joint disease.    This is a new condition  I had Right TKA on 10/31/2018..    Patient reports pain:  Anterior and lateral.  Radiates to:  Thigh.  Pain is described as sharp and is constant and reported as 5/10 and 7/10.  Associated symptoms:  Numbness and edema. Pain is the same all the time.  Symptoms are exacerbated by walking, activity, ascending stairs and bending/squatting and relieved by ice (compression, medication).  Since onset symptoms are gradually improving.    Previous treatment includes physical therapy (left TKA).      Pertinent medical history includes:  High blood pressure and sleep disorder/apnea.        Current occupation .  Patient is currently not working due to present treatment problem.  Primary job tasks include:  Prolonged sitting (computer, plane and car).    Barriers: couple of levels, house is in [place], staying at [facility].    Red flags:  None as reported by the patient.                        Objective:  Standing Alignment:        Lumbar:  Lordosis decr      Knee deviations alignment: right knee flexed,       Gait:    Gait Type:  Antalgic   Weight Bearing Status:  WBAT   Assistive Devices:  Walker  Deviations:  Knee:  Knee flexion decr R and knee extension decr RAnkle:  Push off decr R and heel strike decr R    Flexibility/Screens:   Positive screens:  Knee    Lower Extremity:      Decreased right lower extremity flexibility:  IT Band; Quadriceps; Hamstrings and Gastroc                                                      Knee Evaluation:  ROM:    AROM      Extension: Left:    Right:  Unable to kick it straight  Flexion: Left:   Right: 45  PROM    Hyperextension:  Left:   Right:  0  Extension: Left:   Right:  10  Flexion: Left:   Right:  65    Endfeel: needs mod assist of 1 for SLR  Strength:     Extension:  Right: 1+/5   Pain:  Flexion:  Right: 3-/5   Pain:      Quad Set Right: Poor    Pain:      Palpation:  Palpation of knee: distal quad on the right     Right knee tenderness present at:  Incisional; Biceps Femoral; Semitendinosus; Patellar Medial; Patellar Lateral; Patellar Superior and Patellar Inferior  Edema:  Edema of the knee: significant knee and calf swelling     Mobility Testing:      Patellofemoral Medial:  Right: hypomobile    Patellofemoral Superior:  Right: hypomobile  Patellofemoral Inferior:  Right: hypomobile  Functional Testing:            Proprioception:   Stork Balance Test: Left:   Right:  Unable  % of Uninvolved:           General     ROS    Assessment/Plan:    Patient is a 58 year old male with right side knee complaints.    Patient has the following significant findings with corresponding treatment plan.                Diagnosis 1:  Right TKA  Pain -  hot/cold therapy, manual therapy, self management, education and home program  Decreased ROM/flexibility - manual therapy, therapeutic exercise, therapeutic activity and home program  Decreased joint mobility - manual therapy, therapeutic exercise, therapeutic activity and home program  Decreased strength - therapeutic exercise, therapeutic activities and home program  Impaired balance - neuro re-education, gait training, therapeutic activities, adaptive equipment/assistive device and home program  Edema - cold therapy and self management/home program  Impaired gait - gait training, assistive devices and home program  Impaired muscle performance - neuro re-education and home program  Decreased function - therapeutic activities and home program    Therapy Evaluation Codes:   1) History comprised of:   Personal factors that impact the plan of care:      Living environment and Past/current experiences.     Comorbidity factors that impact the plan of care are:      High blood pressure, Sleep disorder/apnea, Weakness and Left TKA.     Medications impacting care: High blood pressure and Pain.  2) Examination of Body Systems comprised of:   Body structures and functions that impact the plan of care:      Knee.   Activity limitations that impact the plan of care are:      Driving, Dressing, Lifting, Sitting, Squatting/kneeling, Stairs, Standing, Walking, Working and Sleeping.  3) Clinical presentation characteristics are:   Evolving/Changing.  4) Decision-Making    Low complexity using standardized patient assessment instrument and/or measureable assessment of functional outcome.  Cumulative Therapy Evaluation is: Low complexity.    Previous and current functional limitations:  (See Goal Flow Sheet for this information)    Short term and Long term goals: (See Goal Flow Sheet for this information)     Communication ability:  Patient appears to be able to clearly communicate and understand verbal and written communication and follow directions correctly.  Treatment Explanation - The following has been discussed with the patient:   RX ordered/plan of care  Possible risks and side effects  This patient would benefit from PT intervention to resume normal activities.   Rehab potential is good.    Frequency:  3 X week, once daily  Duration:  for 2 weeks tapering to 1 X a week over 4 weeks  Discharge Plan:  Achieve all LTG.  Independent in home treatment program.    Please refer to the daily flowsheet for treatment today, total treatment time and time spent performing 1:1 timed codes.

## 2018-11-05 NOTE — MR AVS SNAPSHOT
After Visit Summary   11/5/2018    Luciano Brown    MRN: [id number]           Patient Information     Date Of Birth          1960        Visit Information        Provider Department      11/5/2018 9:00 AM Venus Garcia, PT [facility] Physical Therapy        Today's Diagnoses     Aftercare following right knee joint replacement surgery    -  1    History of arthroplasty of right knee        Gait abnormality           Follow-ups after your visit        Your next 10 appointments already scheduled     Nov 07, 2018  9:20 AM CST   [facility] Extremity with Rebecca Hanson PT   [facility] ([facility])    [address] [place] [address] [address] [place] [address]   [phone]            Nov 09, 2018  9:40 AM CST   [facility] Extremity with Debi Urrutia [facility] ([facility])    [address] [place] [address] [address] [place] [address]   [phone]            Nov 12, 2018  9:40 AM CST   [facility] Extremity with Debi Urr[facility]ia [facility] Physical Therapy ([facility])    [address] [place] [address] [address] [place] [address]   [phone]            Nov 14, 2018  9:40 AM CST   [facility] Extremity with Venus Garcia PT   [facility] Physical Therapy ([facility])    [address] [place] [address] [address] [place] [address]   [phone]            Nov 16, 2018 10:20 AM CST   [facility] Extremity with Debi Urr[facility]ia [facility] ([facility])    [address] [place] [address] [address] [place] [address]   [phone]              Who to contact     If you have questions or need follow up information about today's clinic visit or your schedule please contact  "[facility] PHYSICAL THERAPY directly at [phone].  Normal or non-critical lab and imaging results will be communicated to you by MyChart, letter or phone within 4 business days after the clinic has received the results. If you do not hear from us within 7 days, please contact the clinic through [facility] or phone. If you have a critical or abnormal lab result, we will notify you by phone as soon as possible.  Submit refill requests through [employer]elt or call your pharmacy and they will forward the refill request to us. Please allow 3 business days for your refill to be completed.          Additional Information About Your Visit        Grandishar[facility]MS Penvision Information     Micropelt lets you send messages to your doctor, view your test results, renew your prescriptions, schedule appointments and more. To sign up, go to [email or web address] . Click on \"Log in\" on the left side of the screen, which will take you to the Welcome page. Then click on \"Sign up Now\" on the right side of the page.     You will be asked to enter the access code listed below, as well as some personal information. Please follow the directions to create your username and password.     Your access code is: [email or web address]  Expires: 2019 12:54 PM     Your access code will  in 90 days. If you need help or a new code, please call your [place] clinic or [phone].        Care EveryWhere ID     This is your Care EveryWhere ID. This could be used by other organizations to access your [facility] [place] medical records  [id number]         Blood Pressure from Last 3 Encounters:   18 139/77   10/19/16 135/90   10/07/16 (!) 131/97    Weight from Last 3 Encounters:   10/31/18 129.2 kg (284 lb 14.4 oz)   10/19/16 119.7 kg (264 lb)   10/07/16 121.1 kg (267 lb)              We Performed the Following     HC PT EVAL, LOW COMPLEXITY     TARAH INITIAL EVAL REPORT     MANUAL THER TECH,1+REGIONS,EA 15 MIN     Physical Therapy Referral  "    THERAPEUTIC EXERCISES        Primary Care Provider Fax #    Physician No Ref-Primary [phone]       No address on file        Equal Access to Services     SEVEN CARLOS : Hien aad ku hadmargo Koo, donda blairemikey, rubén cisneros, lele henokin hayaan bobbyyary bartlettemelyn jay. So [facility] [phone].    ATENCIÓN: Si habla español, tiene a rust disposición servicios gratuitos de asistencia lingüística. Nas al [phone].    We comply with applicable federal civil rights laws and [place] laws. We do not discriminate on the basis of race, color, national origin, age, disability, sex, sexual orientation, or gender identity.            Thank you!     Thank you for choosing [place] PHYSICAL THERAPY  for your care. Our goal is always to provide you with excellent care. Hearing back from our patients is one way we can continue to improve our services. Please take a few minutes to complete the written survey that you may receive in the mail after your visit with us. Thank you!             Your Updated Medication List - Protect others around you: Learn how to safely use, store and throw away your medicines at www.disposemymeds.org.          This list is accurate as of 11/5/18  8:14 PM.  Always use your most recent med list.                   Brand Name Dispense Instructions for use Diagnosis    acetaminophen 325 MG tablet    TYLENOL    100 tablet    Take 3 tablets (975 mg) by mouth every 8 hours    History of arthroplasty of right knee       AMLODIPINE BESYLATE PO      Take 10 mg by mouth every morning        aspirin 325 MG tablet     30 tablet    Take 1 tablet (325 mg) by mouth 2 times daily    History of arthroplasty of right knee       diazepam 5 MG tablet    VALIUM    30 tablet    Take 1 tablet (5 mg) by mouth every 6 hours as needed for anxiety    History of arthroplasty of right knee       ketorolac 10 MG tablet    TORADOL    20 tablet    Take 1 tablet (10 mg)  by mouth every 6 hours as needed    History of arthroplasty of right knee       order for DME     1 Device    CPAP MACHINE  WITH TUBING AND SUPPLIES    TEMITOPE (obstructive sleep apnea)       order for DME     1 each    Equipment being ordered: Walker Wheels () and Walker () Treatment Diagnosis: Impaired ambulation    History of arthroplasty of right knee       oxyCODONE IR 5 MG tablet    ROXICODONE    60 tablet    Take 1-2 tablets (5-10 mg) by mouth every 3 hours as needed    History of arthroplasty of right knee       senna-docusate 8.6-50 MG per tablet    SENOKOT-S;PERICOLACE    30 tablet    Take 1 tablet by mouth 2 times daily    History of arthroplasty of right knee

## 2018-11-07 ENCOUNTER — THERAPY VISIT (OUTPATIENT)
Dept: PHYSICAL THERAPY | Facility: CLINIC | Age: 58
End: 2018-11-07
Payer: COMMERCIAL

## 2018-11-07 DIAGNOSIS — Z47.1 AFTERCARE FOLLOWING RIGHT KNEE JOINT REPLACEMENT SURGERY: ICD-10-CM

## 2018-11-07 DIAGNOSIS — Z96.651 HISTORY OF ARTHROPLASTY OF RIGHT KNEE: ICD-10-CM

## 2018-11-07 DIAGNOSIS — R26.9 GAIT ABNORMALITY: ICD-10-CM

## 2018-11-07 DIAGNOSIS — Z96.651 AFTERCARE FOLLOWING RIGHT KNEE JOINT REPLACEMENT SURGERY: ICD-10-CM

## 2018-11-07 PROCEDURE — 97110 THERAPEUTIC EXERCISES: CPT | Mod: GP | Performed by: PHYSICAL THERAPIST

## 2018-11-09 ENCOUNTER — THERAPY VISIT (OUTPATIENT)
Dept: PHYSICAL THERAPY | Facility: CLINIC | Age: 58
End: 2018-11-09
Payer: COMMERCIAL

## 2018-11-09 DIAGNOSIS — Z47.1 AFTERCARE FOLLOWING RIGHT KNEE JOINT REPLACEMENT SURGERY: ICD-10-CM

## 2018-11-09 DIAGNOSIS — R26.9 GAIT ABNORMALITY: ICD-10-CM

## 2018-11-09 DIAGNOSIS — Z96.651 AFTERCARE FOLLOWING RIGHT KNEE JOINT REPLACEMENT SURGERY: ICD-10-CM

## 2018-11-09 DIAGNOSIS — Z96.651 HISTORY OF ARTHROPLASTY OF RIGHT KNEE: ICD-10-CM

## 2018-11-09 PROCEDURE — 97140 MANUAL THERAPY 1/> REGIONS: CPT | Mod: GP | Performed by: PHYSICAL THERAPY ASSISTANT

## 2018-11-09 PROCEDURE — 97110 THERAPEUTIC EXERCISES: CPT | Mod: GP | Performed by: PHYSICAL THERAPY ASSISTANT

## 2018-11-12 ENCOUNTER — THERAPY VISIT (OUTPATIENT)
Dept: PHYSICAL THERAPY | Facility: CLINIC | Age: 58
End: 2018-11-12
Payer: COMMERCIAL

## 2018-11-12 DIAGNOSIS — Z47.1 AFTERCARE FOLLOWING RIGHT KNEE JOINT REPLACEMENT SURGERY: ICD-10-CM

## 2018-11-12 DIAGNOSIS — R26.9 GAIT ABNORMALITY: ICD-10-CM

## 2018-11-12 DIAGNOSIS — Z96.651 AFTERCARE FOLLOWING RIGHT KNEE JOINT REPLACEMENT SURGERY: ICD-10-CM

## 2018-11-12 DIAGNOSIS — Z96.651 HISTORY OF ARTHROPLASTY OF RIGHT KNEE: ICD-10-CM

## 2018-11-12 PROCEDURE — 97110 THERAPEUTIC EXERCISES: CPT | Mod: GP | Performed by: PHYSICAL THERAPY ASSISTANT

## 2018-11-12 PROCEDURE — 97140 MANUAL THERAPY 1/> REGIONS: CPT | Mod: GP | Performed by: PHYSICAL THERAPY ASSISTANT

## 2018-11-14 ENCOUNTER — THERAPY VISIT (OUTPATIENT)
Dept: PHYSICAL THERAPY | Facility: CLINIC | Age: 58
End: 2018-11-14
Payer: COMMERCIAL

## 2018-11-14 DIAGNOSIS — Z47.1 AFTERCARE FOLLOWING RIGHT KNEE JOINT REPLACEMENT SURGERY: ICD-10-CM

## 2018-11-14 DIAGNOSIS — R26.9 GAIT ABNORMALITY: ICD-10-CM

## 2018-11-14 DIAGNOSIS — Z96.651 HISTORY OF ARTHROPLASTY OF RIGHT KNEE: ICD-10-CM

## 2018-11-14 DIAGNOSIS — Z96.651 AFTERCARE FOLLOWING RIGHT KNEE JOINT REPLACEMENT SURGERY: ICD-10-CM

## 2018-11-14 PROCEDURE — 97110 THERAPEUTIC EXERCISES: CPT | Mod: GP | Performed by: PHYSICAL THERAPIST

## 2018-11-14 PROCEDURE — 97112 NEUROMUSCULAR REEDUCATION: CPT | Mod: GP | Performed by: PHYSICAL THERAPIST

## 2018-11-14 PROCEDURE — 97140 MANUAL THERAPY 1/> REGIONS: CPT | Mod: GP | Performed by: PHYSICAL THERAPIST

## 2018-11-16 ENCOUNTER — THERAPY VISIT (OUTPATIENT)
Dept: PHYSICAL THERAPY | Facility: CLINIC | Age: 58
End: 2018-11-16
Payer: COMMERCIAL

## 2018-11-16 DIAGNOSIS — R26.9 GAIT ABNORMALITY: ICD-10-CM

## 2018-11-16 DIAGNOSIS — Z47.1 AFTERCARE FOLLOWING RIGHT KNEE JOINT REPLACEMENT SURGERY: ICD-10-CM

## 2018-11-16 DIAGNOSIS — Z96.651 HISTORY OF ARTHROPLASTY OF RIGHT KNEE: ICD-10-CM

## 2018-11-16 DIAGNOSIS — Z96.651 AFTERCARE FOLLOWING RIGHT KNEE JOINT REPLACEMENT SURGERY: ICD-10-CM

## 2018-11-16 PROCEDURE — 97112 NEUROMUSCULAR REEDUCATION: CPT | Mod: GP | Performed by: PHYSICAL THERAPY ASSISTANT

## 2018-11-16 PROCEDURE — 97110 THERAPEUTIC EXERCISES: CPT | Mod: GP | Performed by: PHYSICAL THERAPY ASSISTANT

## 2018-11-16 ASSESSMENT — ACTIVITIES OF DAILY LIVING (ADL)
GO UP STAIRS: ACTIVITY IS SOMEWHAT DIFFICULT
KNEEL ON THE FRONT OF YOUR KNEE: ACTIVITY IS FAIRLY DIFFICULT
SWELLING: THE SYMPTOM AFFECTS MY ACTIVITY SLIGHTLY
STAND: ACTIVITY IS NOT DIFFICULT
LIMPING: THE SYMPTOM AFFECTS MY ACTIVITY SLIGHTLY
KNEE_ACTIVITY_OF_DAILY_LIVING_SCORE: 65.71
SQUAT: ACTIVITY IS FAIRLY DIFFICULT
STIFFNESS: THE SYMPTOM AFFECTS MY ACTIVITY MODERATELY
RISE FROM A CHAIR: ACTIVITY IS MINIMALLY DIFFICULT
WALK: ACTIVITY IS MINIMALLY DIFFICULT
HOW_WOULD_YOU_RATE_THE_CURRENT_FUNCTION_OF_YOUR_KNEE_DURING_YOUR_USUAL_DAILY_ACTIVITIES_ON_A_SCALE_FROM_0_TO_100_WITH_100_BEING_YOUR_LEVEL_OF_KNEE_FUNCTION_PRIOR_TO_YOUR_INJURY_AND_0_BEING_THE_INABILITY_TO_PERFORM_ANY_OF_YOUR_USUAL_DAILY_ACTIVITIES?: 65
PAIN: I HAVE THE SYMPTOM BUT IT DOES NOT AFFECT MY ACTIVITY
RAW_SCORE: 46
HOW_WOULD_YOU_RATE_THE_OVERALL_FUNCTION_OF_YOUR_KNEE_DURING_YOUR_USUAL_DAILY_ACTIVITIES?: NEARLY NORMAL
GO DOWN STAIRS: ACTIVITY IS MINIMALLY DIFFICULT
AS_A_RESULT_OF_YOUR_KNEE_INJURY,_HOW_WOULD_YOU_RATE_YOUR_CURRENT_LEVEL_OF_DAILY_ACTIVITY?: NEARLY NORMAL
GIVING WAY, BUCKLING OR SHIFTING OF KNEE: I HAVE THE SYMPTOM BUT IT DOES NOT AFFECT MY ACTIVITY
WEAKNESS: THE SYMPTOM AFFECTS MY ACTIVITY SLIGHTLY
SIT WITH YOUR KNEE BENT: ACTIVITY IS SOMEWHAT DIFFICULT
KNEE_ACTIVITY_OF_DAILY_LIVING_SUM: 46

## 2018-11-16 NOTE — MR AVS SNAPSHOT
"              After Visit Summary   2018    Luciano Brown    MRN: [id number]           Patient Information     Date Of Birth          1960        Visit Information        Provider Department      2018 10:20 AM Debi Urrutia PTA [facility] Physical Therapy        Today's Diagnoses     Aftercare following right knee joint replacement surgery        Gait abnormality        History of arthroplasty of right knee           Follow-ups after your visit        Who to contact     If you have questions or need follow up information about today's clinic visit or your schedule please contact [facility] PHYSICAL [facility] directly at [phone].  Normal or non-critical lab and imaging results will be communicated to you by Gust[employer], letter or phone within 4 business days after the clinic has received the results. If you do not hear from us within 7 days, please contact the clinic through Gusthart or phone. If you have a critical or abnormal lab result, we will notify you by phone as soon as possible.  Submit refill requests through [employer] or call your pharmacy and they will forward the refill request to us. Please allow 3 business days for your refill to be completed.          Additional Information About Your Visit        MyChart Information     [employer] lets you send messages to your doctor, view your test results, renew your prescriptions, schedule appointments and more. To sign up, go to [email or web address] . Click on \"Log in\" on the left side of the screen, which will take you to the Welcome page. Then click on \"Sign up Now\" on the right side of the page.     You will be asked to enter the access code listed below, as well as some personal information. Please follow the directions to create your username and password.     Your access code is: [email or web address]  Expires: 2019 12:54 PM     Your access code will  in 90 " days. If you need help or a new code, please call your [facility] clinic or [phone].        Care EveryWhere ID     This is your Care EveryWhere ID. This could be used by other organizations to access your [facility] medical records  [id number]         Blood Pressure from Last 3 Encounters:   11/02/18 139/77   10/19/16 135/90   10/07/16 (!) 131/97    Weight from Last 3 Encounters:   10/31/18 129.2 kg (284 lb 14.4 oz)   10/19/16 119.7 kg (264 lb)   10/07/16 121.1 kg (267 lb)              We Performed the Following     NEUROMUSCULAR RE-EDUCATION     THERAPEUTIC EXERCISES        Primary Care Provider Fax #    Physician No Ref-Primary [phone]       No address on file        Equal Access to Services     SEVEN CARLOS : Hien Koo, wafelipa luqadaha, qaybta kaalmada amelia, lele quiroz . So [facility] [phone].    ATENCIÓN: Si habla español, tiene a rust disposición servicios gratuitos de asistencia lingüística. Llame al [phone].    We comply with applicable federal civil rights laws and [place] laws. We do not discriminate on the basis of race, color, national origin, age, disability, sex, sexual orientation, or gender identity.            Thank you!     Thank you for choosing [facility] PHYSICAL THERAP[facility]  for your care. Our goal is always to provide you with excellent care. Hearing back from our patients is one way we can continue to improve our services. Please take a few minutes to complete the written survey that you may receive in the mail after your visit with us. Thank you!             Your Updated Medication List - Protect others around you: Learn how to safely use, store and throw away your medicines at www.disposemymeds.org.          This list is accurate as of 11/16/18  5:35 PM.  Always use your most recent med list.                   Brand Name Dispense Instructions for use Diagnosis    acetaminophen 325 MG tablet     TYLENOL    100 tablet    Take 3 tablets (975 mg) by mouth every 8 hours    History of arthroplasty of right knee       AMLODIPINE BESYLATE PO      Take 10 mg by mouth every morning        aspirin 325 MG tablet     30 tablet    Take 1 tablet (325 mg) by mouth 2 times daily    History of arthroplasty of right knee       diazepam 5 MG tablet    VALIUM    30 tablet    Take 1 tablet (5 mg) by mouth every 6 hours as needed for anxiety    History of arthroplasty of right knee       ketorolac 10 MG tablet    TORADOL    20 tablet    Take 1 tablet (10 mg) by mouth every 6 hours as needed    History of arthroplasty of right knee       order for DME     1 Device    CPAP MACHINE  WITH TUBING AND SUPPLIES    TEMITOPE (obstructive sleep apnea)       order for DME     1 each    Equipment being ordered: Walker Wheels () and Walker () Treatment Diagnosis: Impaired ambulation    History of arthroplasty of right knee       oxyCODONE IR 5 MG tablet    ROXICODONE    60 tablet    Take 1-2 tablets (5-10 mg) by mouth every 3 hours as needed    History of arthroplasty of right knee       senna-docusate 8.6-50 MG per tablet    SENOKOT-S;PERICOLACE    30 tablet    Take 1 tablet by mouth 2 times daily    History of arthroplasty of right knee

## 2018-11-16 NOTE — LETTER
[facility] PHYSICAL THERAPY  [address]  [place] [address]  [phone]    2018    Re: Luciano Brown   :   1960  MRN:  [id number]   REFERRING PHYSICIAN:   Colby Costello    [facility] PHYSICAL THERAPY    Date of Initial Evaluation:  2018  Visits:  Rxs Used: 6  Reason for Referral:     Aftercare following right knee joint replacement surgery  Gait abnormality  History of arthroplasty of right knee    DISCHARGE REPORT  Progress reporting period is from 18 to 18.      SUBJECTIVE  Subjective changes noted by patient:    Patient has been seen for 6 PT reporting 65% improved with daily function.  Current complaints of swelling and low level pain.  Patient ambulating with SEC.  Patient will be traveling back to home in [place] where he will continue PT.    Current Pain level: (3-4/10)    Previous pain level was: 7/10     Changes in function: Yes (See Goal flowsheet attached for changes in current functional level)     Adverse reaction to treatment or activity: None     OBJECTIVE  Changes noted in objective findings: Yes, improved ROM, strength and functional ability.     R knee AROM: 0-10-70 (improved from 45 deg flex).   R knee PROM: 0-8-85 (improved from 0-10-65 deg).   MMT R knee ext 2+/5, R knee flex 4-/5, min A needed to perform SLR flexion.   Patient ambulating with SEC, lacking extension, cues to increase HS, PO and upright posture. Step to pattern for stair climbing with railing.   Knee ADLS has improved from 17-66%.        ASSESSMENT/PLAN  Updated problem list and treatment plan:   Diagnosis 1: S/p R TKA    Pain - hot/cold therapy and home program  Decreased ROM/flexibility - manual therapy, therapeutic exercise and home program  Decreased joint mobility - manual therapy, therapeutic exercise and home program  Decreased strength - therapeutic exercise, therapeutic  activities and home program    Re: Luciano Brown   :   1960    Decreased proprioception - neuro re-education, gait training, therapeutic activities and home program  Impaired gait - gait training, assistive devices and home program  Decreased function - therapeutic activities and home program  STG/LTGs have been met: Yes (See Goal flow sheet completed today.)  Progress toward STG/LTGs have been made: Yes (See Goal flow sheet completed today.)  Assessment of Progress: The patient's condition is improving.  The patient's condition has potential to improve.  Self Management Plans: Patient has been instructed in a home treatment program.  Patient has been instructed in self management of symptoms.  I have re-evaluated this patient and find that the nature, scope, duration and intensity of the therapy is appropriate for the medical condition of the patient.  Luciano continues to require the following intervention to meet STG and LT's: PT    Recommendations:  This patient will be DC'd from PT in this clinic and would benefit from continued therapy in [place].  Patient traveling back home to [place] in several days and will resume PT there next week.       The progress note/discharge summary was written in collaboration with and reviewed by the physical therapist.          Thank you for your referral.    INQUIRIES  Therapist: Nesha House, PT   [facility]  [address] [address]  Phone: [phone]  Fax: [phone]

## 2018-11-20 PROBLEM — Z96.651 AFTERCARE FOLLOWING RIGHT KNEE JOINT REPLACEMENT SURGERY: Status: RESOLVED | Noted: 2018-11-05 | Resolved: 2018-11-16

## 2018-11-20 PROBLEM — Z47.1 AFTERCARE FOLLOWING RIGHT KNEE JOINT REPLACEMENT SURGERY: Status: RESOLVED | Noted: 2018-11-05 | Resolved: 2018-11-16

## 2018-11-20 PROBLEM — Z96.651 HISTORY OF ARTHROPLASTY OF RIGHT KNEE: Status: RESOLVED | Noted: 2018-10-31 | Resolved: 2018-11-16

## 2018-11-20 PROBLEM — R26.9 GAIT ABNORMALITY: Status: RESOLVED | Noted: 2018-11-05 | Resolved: 2018-11-16

## 2018-11-20 NOTE — PROGRESS NOTES
Subjective:  HPI       Knee Activity of Daily Living Score: 65.71            Objective:  System    Physical Exam    General     ROS    Assessment/Plan:    ASSESSMENT/PLAN  Updated problem list and treatment plan: Diagnosis 1:  S/p R TKA  Pain -  hot/cold therapy and home program  Decreased ROM/flexibility - manual therapy, therapeutic exercise and home program  Decreased joint mobility - manual therapy, therapeutic exercise and home program  Decreased strength - therapeutic exercise, therapeutic activities and home program  Decreased proprioception - neuro re-education, gait training, therapeutic activities and home program  Impaired gait - gait training, assistive devices and home program  Decreased function - therapeutic activities and home program  STG/LTGs have been met:  Yes (See Goal flow sheet completed today.)  Progress toward STG/LTGs have been made:  Yes (See Goal flow sheet completed today.)  Assessment of Progress: The patient's condition is improving.  The patient's condition has potential to improve.  Self Management Plans:  Patient has been instructed in a home treatment program.  Patient  has been instructed in self management of symptoms.  I have re-evaluated this patient and find that the nature, scope, duration and intensity of the therapy is appropriate for the medical condition of the patient.  Luciano continues to require the following intervention to meet STG and LT's:  PT    Recommendations:  This patient will be DC'd from PT in this clinic and would benefit from continued therapy in [place]. Patient traveling back home to [place] in sveral days and will resume PT there next week         The progress note/discharge summary was written in collaboration with and reviewed by the physical therapist.    Please refer to the daily flowsheet for treatment today, total treatment time and time spent performing 1:1 timed codes.                      
Subjective:  HPI       Knee Activity of Daily Living Score: 65.71            Objective:  System    Physical Exam    General     ROS    Assessment/Plan:    DISCHARGE REPORT    Progress reporting period is from 11/5/18 to 11/16/18.      SUBJECTIVE  Subjective changes noted by patient:    Pt has been seen for 6 PT reporting 65% improved with daily function. Current complaints of swelling and low level pain. Pt ambulating with SEC. Pt will be traveling back to home in [place] where he will continue PT.    Current Pain level:  (3-4/10 )    Previous pain level was:  7/10     Changes in function:  Yes (See Goal flowsheet attached for changes in current functional level)     Adverse reaction to treatment or activity: None     OBJECTIVE  Changes noted in objective findings:  Yes, improved ROM, strength and functional ability.     R knee AROM: 0-10-70 (improved from 45 deg flex).   R knee PROM: 0-8-85 (improved from 0-10-65 deg).   MMT R knee ext 2+/5, R knee flex 4-/5, min A needed to perform SLR flexion.   Pt ambulating with SEC, lacking extension, cues to increase HS, PO and upright posture. Step to pattern for stair climbing with railing.   Knee ADLS has improved from 17-66%.            
stretcher

## 2019-07-02 NOTE — PROGRESS NOTES
Chart reviewed. MRSA-MSSA not done with pre-op. Will order antisepsis DOS per protocol.   Oriented - self; Oriented - place; Oriented - time

## 2022-05-13 NOTE — PLAN OF CARE
Problem: Patient Care Overview  Goal: Plan of Care/Patient Progress Review  Discharge Planner PT   Patient plan for discharge: Home with outpatient PT  Current status: Pt is min assist for bed mobility, educated on increasing independence with R LE by using L LE to assist. Pt supervision for transfers and SBA for ambulation x300' with rolling walker. Pt up/down 3 stairs with B rails and CGA. Pt rates pain as 6-7/10. Pt's R knee ROM 12-70 and increased swelling noted today.  Barriers to return to prior living situation: Decreased activity tolerance, decreased strength, decreased ROM  Recommendations for discharge: Home with family assist, outpatient PT  Rationale for recommendations: Pt is able to complete mobility tasks necessary for discharge to home though do anticipate need for assistance with some tasks at times. Pt states his spouse will be home with him and able to assist as needed. Recommend outpatient PT to further progress ROM and strength promoting optimal functional outcomes.       Entered by: Lottie Veliz 11/02/2018 9:13 AM       PM Session: Pt able to ambulate with rolling walker mod I, no episodes of knee buckling. Pt continues with increased R knee swelling and decreased quad strength as well as decreased flexion ROM. Pt encouraged to complete flexion exercises 3x/day after discharge, to follow up with outpatient PT on Monday.    Physical Therapy Discharge Summary    Reason for therapy discharge:    Discharged to home with outpatient therapy.    Progress towards therapy goal(s). See goals on Care Plan in [facility] electronic health record for goal details.  Goals partially met.  Barriers to achieving goals:   discharge from facility.    Therapy recommendation(s):    Continued therapy is recommended.  Rationale/Recommendations:  Outpatient PT to further progress ROM and strength and promote optimal functional recovery.       steady gait

## 2022-08-06 ENCOUNTER — HOSPITAL ENCOUNTER (EMERGENCY)
Facility: HOSPITAL | Age: 62
Discharge: HOME | End: 2022-08-06
Attending: EMERGENCY MEDICINE | Admitting: EMERGENCY MEDICINE
Payer: COMMERCIAL

## 2022-08-06 VITALS
TEMPERATURE: 96.8 F | DIASTOLIC BLOOD PRESSURE: 86 MMHG | RESPIRATION RATE: 16 BRPM | HEART RATE: 70 BPM | SYSTOLIC BLOOD PRESSURE: 155 MMHG | OXYGEN SATURATION: 100 %

## 2022-08-06 DIAGNOSIS — M54.50 ACUTE LEFT-SIDED LOW BACK PAIN WITHOUT SCIATICA: ICD-10-CM
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
## 2022-08-06 DIAGNOSIS — S16.1XXA CERVICAL STRAIN, ACUTE, INITIAL ENCOUNTER: ICD-10-CM

## 2022-08-06 PROCEDURE — 3E0333Z INTRODUCTION OF ANTI-INFLAMMATORY INTO PERIPHERAL VEIN, PERCUTANEOUS APPROACH: ICD-10-PCS | Performed by: EMERGENCY MEDICINE

## 2022-08-06 PROCEDURE — 63600000 HC DRUGS/DETAIL CODE

## 2022-08-06 PROCEDURE — 99283 EMERGENCY DEPT VISIT LOW MDM: CPT | Mod: 25

## 2022-08-06 PROCEDURE — 63700000 HC SELF-ADMINISTRABLE DRUG

## 2022-08-06 PROCEDURE — 96372 THER/PROPH/DIAG INJ SC/IM: CPT

## 2022-08-06 RX ORDER — CYCLOBENZAPRINE HCL 10 MG
10 TABLET ORAL 3 TIMES DAILY PRN
Qty: 12 TABLET | Refills: 0 | Status: SHIPPED | OUTPATIENT
Start: 2022-08-06

## 2022-08-06 RX ORDER — AMLODIPINE BESYLATE 10 MG/1
TABLET ORAL
COMMUNITY
Start: 2022-06-05

## 2022-08-06 RX ORDER — LIDOCAINE 560 MG/1
1 PATCH PERCUTANEOUS; TOPICAL; TRANSDERMAL ONCE
Status: DISCONTINUED | OUTPATIENT
Start: 2022-08-06 | End: 2022-08-06 | Stop reason: HOSPADM

## 2022-08-06 RX ORDER — KETOROLAC TROMETHAMINE 30 MG/ML
30 INJECTION, SOLUTION INTRAMUSCULAR; INTRAVENOUS ONCE
Status: COMPLETED | OUTPATIENT
Start: 2022-08-06 | End: 2022-08-06

## 2022-08-06 RX ADMIN — KETOROLAC TROMETHAMINE 30 MG: 30 INJECTION, SOLUTION INTRAMUSCULAR at 15:02

## 2022-08-06 RX ADMIN — LIDOCAINE 1 PATCH: 246 PATCH TOPICAL at 15:02
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
## 2022-08-06 NOTE — DISCHARGE INSTRUCTIONS
Please follow-up with your primary care doctor for further monitoring of symptoms.  Take ibuprofen or Tylenol as needed for pain.  Take Flexeril as needed for spasm.  Return to the ER for evaluation of worsening pain, numbness or tingling, loss of bowel or bladder function, weakness, fevers or any other concerns.

## 2022-08-06 NOTE — Clinical Note
Jovi Gamboa was seen and treated in our emergency department on 8/6/2022.  Jovi Gamboa may return to work on 08/11/2022.  ?     If you have any questions or concerns, please don't hesitate to call.      Halina Patel PA C
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
## 2022-08-07 ASSESSMENT — ENCOUNTER SYMPTOMS
DIZZINESS: 0
NAUSEA: 0
NECK PAIN: 1
LIGHT-HEADEDNESS: 0
WOUND: 0
JOINT SWELLING: 0
MYALGIAS: 0
COLOR CHANGE: 0
ABDOMINAL DISTENTION: 0
HEMATURIA: 0
DIFFICULTY URINATING: 0
FACIAL ASYMMETRY: 0
SPEECH DIFFICULTY: 0
CONFUSION: 0
ABDOMINAL PAIN: 0
WEAKNESS: 0
ARTHRALGIAS: 0
PALPITATIONS: 0
HEADACHES: 1
FACIAL SWELLING: 0
FATIGUE: 0
SHORTNESS OF BREATH: 0
COUGH: 0
BLOOD IN STOOL: 0
VOMITING: 0
BACK PAIN: 1
NUMBNESS: 0
FEVER: 0

## (undated) DEVICE — ESU PENCIL W/SMOKE EVAC NEPTUNE STRYKER 0703-046-000

## (undated) DEVICE — GLOVE PROTEXIS POWDER FREE 8.0 ORTHOPEDIC 2D73ET80

## (undated) DEVICE — LINEN TOWEL PACK X5 5464

## (undated) DEVICE — BONE CLEANING TIP INTERPULSE  0210-010-000

## (undated) DEVICE — ESU GROUND PAD UNIVERSAL W/O CORD

## (undated) DEVICE — SU ETHIBOND 2 V-37 4X30" MX69G

## (undated) DEVICE — BONE CEMENT MIXEVAC III HI VAC KIT  0206-015-000

## (undated) DEVICE — CAST PADDING 6" UNSTERILE 9046

## (undated) DEVICE — GLOVE PROTEXIS POWDER FREE 6.5 ORTHOPEDIC 2D73ET65

## (undated) DEVICE — TUBING SUCTION 12"X1/4" N612

## (undated) DEVICE — GLOVE PROTEXIS BLUE W/NEU-THERA 6.5  2D73EB65

## (undated) DEVICE — SUCTION IRR SYSTEM W/O TIP INTERPULSE HANDPIECE 0210-100-000

## (undated) DEVICE — MANIFOLD NEPTUNE 4 PORT 700-20

## (undated) DEVICE — DRSG ADAPTIC 3X8" 6113

## (undated) DEVICE — SOLUTION WOUND CLEANSING 3/4OZ 10% PVP EA-L3011FB-50

## (undated) DEVICE — Device

## (undated) DEVICE — SOL WATER IRRIG 1000ML BOTTLE 2F7114

## (undated) DEVICE — SU VICRYL 2-0 CT-1 27" UND J259H

## (undated) DEVICE — BLADE SAW SAGITTAL STRK 19.5X95X1.27MM 2108-109-000S15

## (undated) DEVICE — PACK TOTAL KNEE SOP15TKFSD

## (undated) DEVICE — BLADE SAW SAGITTAL STRK 25X79.5X1.24MM 4/2000 2108-318-000

## (undated) DEVICE — SOL NACL 0.9% IRRIG 1000ML BOTTLE 07138-09

## (undated) DEVICE — PREP CHLORAPREP 26ML TINTED ORANGE  260815

## (undated) DEVICE — WRAP EZY KNEE

## (undated) RX ORDER — ONDANSETRON 2 MG/ML
INJECTION INTRAMUSCULAR; INTRAVENOUS
Status: DISPENSED
Start: 2018-10-31

## (undated) RX ORDER — DEXAMETHASONE SODIUM PHOSPHATE 4 MG/ML
INJECTION, SOLUTION INTRA-ARTICULAR; INTRALESIONAL; INTRAMUSCULAR; INTRAVENOUS; SOFT TISSUE
Status: DISPENSED
Start: 2018-10-31

## (undated) RX ORDER — LIDOCAINE HYDROCHLORIDE 20 MG/ML
INJECTION, SOLUTION EPIDURAL; INFILTRATION; INTRACAUDAL; PERINEURAL
Status: DISPENSED
Start: 2018-10-31

## (undated) RX ORDER — CEFAZOLIN SODIUM 2 G/100ML
INJECTION, SOLUTION INTRAVENOUS
Status: DISPENSED
Start: 2018-10-31

## (undated) RX ORDER — PROPOFOL 10 MG/ML
INJECTION, EMULSION INTRAVENOUS
Status: DISPENSED
Start: 2018-10-31

## (undated) RX ORDER — FENTANYL CITRATE 50 UG/ML
INJECTION, SOLUTION INTRAMUSCULAR; INTRAVENOUS
Status: DISPENSED
Start: 2018-10-31

## (undated) RX ORDER — HYDROMORPHONE HYDROCHLORIDE 1 MG/ML
INJECTION, SOLUTION INTRAMUSCULAR; INTRAVENOUS; SUBCUTANEOUS
Status: DISPENSED
Start: 2018-10-31

## (undated) RX ORDER — CEFAZOLIN SODIUM IN 0.9 % NACL 3 G/100 ML
INTRAVENOUS SOLUTION, PIGGYBACK (ML) INTRAVENOUS
Status: DISPENSED
Start: 2018-10-31

## (undated) RX ORDER — KETOROLAC TROMETHAMINE 30 MG/ML
INJECTION, SOLUTION INTRAMUSCULAR; INTRAVENOUS
Status: DISPENSED
Start: 2018-10-31